# Patient Record
Sex: MALE | Race: WHITE | Employment: FULL TIME | URBAN - NONMETROPOLITAN AREA
[De-identification: names, ages, dates, MRNs, and addresses within clinical notes are randomized per-mention and may not be internally consistent; named-entity substitution may affect disease eponyms.]

---

## 2019-02-15 ENCOUNTER — ANESTHESIA (OUTPATIENT)
Dept: ENDOSCOPY | Age: 44
End: 2019-02-15
Payer: COMMERCIAL

## 2019-02-15 ENCOUNTER — HOSPITAL ENCOUNTER (OUTPATIENT)
Age: 44
Setting detail: OUTPATIENT SURGERY
Discharge: HOME | End: 2019-02-15
Attending: INTERNAL MEDICINE | Admitting: INTERNAL MEDICINE
Payer: COMMERCIAL

## 2019-02-15 ENCOUNTER — ANESTHESIA EVENT (OUTPATIENT)
Dept: ENDOSCOPY | Age: 44
End: 2019-02-15
Payer: COMMERCIAL

## 2019-02-15 VITALS
HEART RATE: 79 BPM | BODY MASS INDEX: 37.93 KG/M2 | HEIGHT: 66 IN | SYSTOLIC BLOOD PRESSURE: 111 MMHG | RESPIRATION RATE: 19 BRPM | TEMPERATURE: 98.6 F | OXYGEN SATURATION: 95 % | DIASTOLIC BLOOD PRESSURE: 57 MMHG | WEIGHT: 236 LBS

## 2019-02-15 VITALS
SYSTOLIC BLOOD PRESSURE: 131 MMHG | DIASTOLIC BLOOD PRESSURE: 80 MMHG | RESPIRATION RATE: 21 BRPM | OXYGEN SATURATION: 98 %

## 2019-02-15 PROCEDURE — 6360000002 HC RX W HCPCS: Performed by: REGISTERED NURSE

## 2019-02-15 PROCEDURE — 3609012400 HC EGD TRANSORAL BIOPSY SINGLE/MULTIPLE: Performed by: INTERNAL MEDICINE

## 2019-02-15 PROCEDURE — 7100000000 HC PACU RECOVERY - FIRST 15 MIN: Performed by: INTERNAL MEDICINE

## 2019-02-15 PROCEDURE — 88305 TISSUE EXAM BY PATHOLOGIST: CPT

## 2019-02-15 PROCEDURE — 2500000003 HC RX 250 WO HCPCS: Performed by: REGISTERED NURSE

## 2019-02-15 PROCEDURE — 3700000000 HC ANESTHESIA ATTENDED CARE: Performed by: INTERNAL MEDICINE

## 2019-02-15 PROCEDURE — 2580000003 HC RX 258: Performed by: INTERNAL MEDICINE

## 2019-02-15 PROCEDURE — 3700000001 HC ADD 15 MINUTES (ANESTHESIA): Performed by: INTERNAL MEDICINE

## 2019-02-15 PROCEDURE — 2709999900 HC NON-CHARGEABLE SUPPLY: Performed by: INTERNAL MEDICINE

## 2019-02-15 RX ORDER — PROPOFOL 10 MG/ML
INJECTION, EMULSION INTRAVENOUS PRN
Status: DISCONTINUED | OUTPATIENT
Start: 2019-02-15 | End: 2019-02-15 | Stop reason: SDUPTHER

## 2019-02-15 RX ORDER — LIDOCAINE HYDROCHLORIDE 20 MG/ML
INJECTION, SOLUTION EPIDURAL; INFILTRATION; INTRACAUDAL; PERINEURAL PRN
Status: DISCONTINUED | OUTPATIENT
Start: 2019-02-15 | End: 2019-02-15 | Stop reason: SDUPTHER

## 2019-02-15 RX ORDER — SODIUM CHLORIDE 450 MG/100ML
INJECTION, SOLUTION INTRAVENOUS CONTINUOUS
Status: DISCONTINUED | OUTPATIENT
Start: 2019-02-15 | End: 2019-02-15 | Stop reason: HOSPADM

## 2019-02-15 RX ADMIN — PROPOFOL 240 MG: 10 INJECTION, EMULSION INTRAVENOUS at 11:30

## 2019-02-15 RX ADMIN — LIDOCAINE HYDROCHLORIDE 100 MG: 20 INJECTION, SOLUTION EPIDURAL; INFILTRATION; INTRACAUDAL; PERINEURAL at 11:30

## 2019-02-15 RX ADMIN — SODIUM CHLORIDE: 4.5 INJECTION, SOLUTION INTRAVENOUS at 10:21

## 2019-02-15 ASSESSMENT — PAIN - FUNCTIONAL ASSESSMENT: PAIN_FUNCTIONAL_ASSESSMENT: 0-10

## 2019-02-15 ASSESSMENT — PAIN SCALES - GENERAL: PAINLEVEL_OUTOF10: 0

## 2021-03-20 ENCOUNTER — HOSPITAL ENCOUNTER (OUTPATIENT)
Age: 46
Discharge: HOME | End: 2021-03-20
Payer: COMMERCIAL

## 2021-03-20 LAB
ALBUMIN SERPL-MCNC: 4.3 G/DL (ref 3.5–5.1)
ALP BLD-CCNC: 52 U/L (ref 38–126)
ALT SERPL-CCNC: 23 U/L (ref 11–66)
ANION GAP SERPL CALCULATED.3IONS-SCNC: 9 MEQ/L (ref 8–16)
AST SERPL-CCNC: 21 U/L (ref 5–40)
BILIRUB SERPL-MCNC: 0.3 MG/DL (ref 0.3–1.2)
BUN BLDV-MCNC: 13 MG/DL (ref 7–22)
CALCIUM SERPL-MCNC: 9.4 MG/DL (ref 8.5–10.5)
CHLORIDE BLD-SCNC: 104 MEQ/L (ref 98–111)
CHOLESTEROL, TOTAL: 228 MG/DL (ref 100–199)
CO2: 24 MEQ/L (ref 23–33)
CREAT SERPL-MCNC: 0.9 MG/DL (ref 0.4–1.2)
GFR SERPL CREATININE-BSD FRML MDRD: > 90 ML/MIN/1.73M2
GLUCOSE BLD-MCNC: 97 MG/DL (ref 70–108)
HDLC SERPL-MCNC: 30 MG/DL
LDL CHOLESTEROL CALCULATED: 124 MG/DL
MAGNESIUM: 2.1 MG/DL (ref 1.6–2.4)
POTASSIUM SERPL-SCNC: 4.2 MEQ/L (ref 3.5–5.2)
SODIUM BLD-SCNC: 137 MEQ/L (ref 135–145)
TOTAL IRON BINDING CAPACITY: 365 UG/DL (ref 171–450)
TOTAL PROTEIN: 7.5 G/DL (ref 6.1–8)
TRIGL SERPL-MCNC: 370 MG/DL (ref 0–199)
TSH SERPL DL<=0.05 MIU/L-ACNC: 0.9 UIU/ML (ref 0.4–4.2)

## 2021-03-20 PROCEDURE — 80053 COMPREHEN METABOLIC PANEL: CPT

## 2021-03-20 PROCEDURE — 36415 COLL VENOUS BLD VENIPUNCTURE: CPT

## 2021-03-20 PROCEDURE — 84443 ASSAY THYROID STIM HORMONE: CPT

## 2021-03-20 PROCEDURE — 80061 LIPID PANEL: CPT

## 2021-03-20 PROCEDURE — 83550 IRON BINDING TEST: CPT

## 2021-03-20 PROCEDURE — 83735 ASSAY OF MAGNESIUM: CPT

## 2021-04-09 ENCOUNTER — HOSPITAL ENCOUNTER (OUTPATIENT)
Dept: CT IMAGING | Age: 46
Discharge: HOME | End: 2021-04-09
Payer: COMMERCIAL

## 2021-04-09 DIAGNOSIS — R10.13 ABDOMINAL PAIN, EPIGASTRIC: ICD-10-CM

## 2021-04-09 PROCEDURE — 6360000004 HC RX CONTRAST MEDICATION: Performed by: SURGERY

## 2021-04-09 PROCEDURE — 74178 CT ABD&PLV WO CNTR FLWD CNTR: CPT

## 2021-04-09 RX ADMIN — IOPAMIDOL 85 ML: 755 INJECTION, SOLUTION INTRAVENOUS at 09:12

## 2021-04-09 RX ADMIN — IOHEXOL 50 ML: 240 INJECTION, SOLUTION INTRATHECAL; INTRAVASCULAR; INTRAVENOUS; ORAL at 09:11

## 2021-09-18 ENCOUNTER — HOSPITAL ENCOUNTER (OUTPATIENT)
Age: 46
Discharge: HOME | End: 2021-09-18
Payer: COMMERCIAL

## 2021-09-18 LAB
ALBUMIN SERPL-MCNC: 4.6 G/DL (ref 3.5–5.1)
ALP BLD-CCNC: 56 U/L (ref 38–126)
ALT SERPL-CCNC: 39 U/L (ref 11–66)
ANION GAP SERPL CALCULATED.3IONS-SCNC: 12 MEQ/L (ref 8–16)
AST SERPL-CCNC: 26 U/L (ref 5–40)
BACTERIA: NORMAL
BASOPHILS # BLD: 0.8 %
BASOPHILS ABSOLUTE: 0 THOU/MM3 (ref 0–0.1)
BILIRUB SERPL-MCNC: 0.5 MG/DL (ref 0.3–1.2)
BILIRUBIN URINE: NEGATIVE
BLOOD, URINE: NEGATIVE
BUN BLDV-MCNC: 19 MG/DL (ref 7–22)
CALCIUM SERPL-MCNC: 9.3 MG/DL (ref 8.5–10.5)
CASTS: NORMAL /LPF
CASTS: NORMAL /LPF
CHARACTER, URINE: CLEAR
CHLORIDE BLD-SCNC: 102 MEQ/L (ref 98–111)
CHOLESTEROL, TOTAL: 258 MG/DL (ref 100–199)
CO2: 24 MEQ/L (ref 23–33)
COLOR: YELLOW
CREAT SERPL-MCNC: 0.9 MG/DL (ref 0.4–1.2)
CREATININE, URINE: 226.7 MG/DL
CRYSTALS: NORMAL
EOSINOPHIL # BLD: 2.3 %
EOSINOPHILS ABSOLUTE: 0.1 THOU/MM3 (ref 0–0.4)
EPITHELIAL CELLS, UA: NORMAL /HPF
ERYTHROCYTE [DISTWIDTH] IN BLOOD BY AUTOMATED COUNT: 12.7 % (ref 11.5–14.5)
ERYTHROCYTE [DISTWIDTH] IN BLOOD BY AUTOMATED COUNT: 43.9 FL (ref 35–45)
GFR SERPL CREATININE-BSD FRML MDRD: > 90 ML/MIN/1.73M2
GLUCOSE BLD-MCNC: 105 MG/DL (ref 70–108)
GLUCOSE, URINE: NEGATIVE MG/DL
HCT VFR BLD CALC: 48.1 % (ref 42–52)
HDLC SERPL-MCNC: 32 MG/DL
HEMOGLOBIN: 16 GM/DL (ref 14–18)
IMMATURE GRANS (ABS): 0.01 THOU/MM3 (ref 0–0.07)
IMMATURE GRANULOCYTES: 0.2 %
KETONES, URINE: NEGATIVE
LDL CHOLESTEROL CALCULATED: ABNORMAL MG/DL
LEUKOCYTE ESTERASE, URINE: NEGATIVE
LYMPHOCYTES # BLD: 39.3 %
LYMPHOCYTES ABSOLUTE: 2 THOU/MM3 (ref 1–4.8)
MCH RBC QN AUTO: 31.3 PG (ref 26–33)
MCHC RBC AUTO-ENTMCNC: 33.3 GM/DL (ref 32.2–35.5)
MCV RBC AUTO: 94.1 FL (ref 80–94)
MICROALBUMIN UR-MCNC: < 1.2 MG/DL
MICROALBUMIN/CREAT UR-RTO: 5 MG/G (ref 0–30)
MISCELLANEOUS LAB TEST RESULT: NORMAL
MONOCYTES # BLD: 9 %
MONOCYTES ABSOLUTE: 0.5 THOU/MM3 (ref 0.4–1.3)
NITRITE, URINE: NEGATIVE
NUCLEATED RED BLOOD CELLS: 0 /100 WBC
PH UA: 6 (ref 5–9)
PLATELET # BLD: 232 THOU/MM3 (ref 130–400)
PMV BLD AUTO: 11 FL (ref 9.4–12.4)
POTASSIUM SERPL-SCNC: 4.5 MEQ/L (ref 3.5–5.2)
PROTEIN UA: NEGATIVE MG/DL
RBC # BLD: 5.11 MILL/MM3 (ref 4.7–6.1)
RBC URINE: NORMAL /HPF
RENAL EPITHELIAL, UA: NORMAL
SEG NEUTROPHILS: 48.4 %
SEGMENTED NEUTROPHILS ABSOLUTE COUNT: 2.5 THOU/MM3 (ref 1.8–7.7)
SODIUM BLD-SCNC: 138 MEQ/L (ref 135–145)
SPECIFIC GRAVITY UA: 1.02 (ref 1–1.03)
TOTAL PROTEIN: 7.7 G/DL (ref 6.1–8)
TRIGL SERPL-MCNC: 456 MG/DL (ref 0–199)
TSH SERPL DL<=0.05 MIU/L-ACNC: 0.9 UIU/ML (ref 0.4–4.2)
UROBILINOGEN, URINE: 0.2 EU/DL (ref 0–1)
WBC # BLD: 5.2 THOU/MM3 (ref 4.8–10.8)
WBC UA: NORMAL /HPF
YEAST: NORMAL

## 2021-09-18 PROCEDURE — 82043 UR ALBUMIN QUANTITATIVE: CPT

## 2021-09-18 PROCEDURE — 81001 URINALYSIS AUTO W/SCOPE: CPT

## 2021-09-18 PROCEDURE — 80053 COMPREHEN METABOLIC PANEL: CPT

## 2021-09-18 PROCEDURE — 85025 COMPLETE CBC W/AUTO DIFF WBC: CPT

## 2021-09-18 PROCEDURE — 36415 COLL VENOUS BLD VENIPUNCTURE: CPT

## 2021-09-18 PROCEDURE — 80061 LIPID PANEL: CPT

## 2021-09-18 PROCEDURE — 84443 ASSAY THYROID STIM HORMONE: CPT

## 2021-11-26 ENCOUNTER — HOSPITAL ENCOUNTER (EMERGENCY)
Age: 46
Discharge: HOME | End: 2021-11-26
Payer: COMMERCIAL

## 2021-11-26 VITALS
HEIGHT: 66 IN | HEART RATE: 91 BPM | WEIGHT: 260 LBS | RESPIRATION RATE: 20 BRPM | SYSTOLIC BLOOD PRESSURE: 143 MMHG | DIASTOLIC BLOOD PRESSURE: 94 MMHG | TEMPERATURE: 97.7 F | OXYGEN SATURATION: 99 % | BODY MASS INDEX: 41.78 KG/M2

## 2021-11-26 DIAGNOSIS — J01.90 ACUTE BACTERIAL SINUSITIS: Primary | ICD-10-CM

## 2021-11-26 DIAGNOSIS — B96.89 ACUTE BACTERIAL SINUSITIS: Primary | ICD-10-CM

## 2021-11-26 PROCEDURE — 99213 OFFICE O/P EST LOW 20 MIN: CPT | Performed by: NURSE PRACTITIONER

## 2021-11-26 PROCEDURE — 99213 OFFICE O/P EST LOW 20 MIN: CPT

## 2021-11-26 RX ORDER — AMOXICILLIN AND CLAVULANATE POTASSIUM 875; 125 MG/1; MG/1
1 TABLET, FILM COATED ORAL 2 TIMES DAILY
Qty: 14 TABLET | Refills: 0 | Status: SHIPPED | OUTPATIENT
Start: 2021-11-26 | End: 2021-12-03

## 2021-11-26 RX ORDER — PREDNISONE 20 MG/1
40 TABLET ORAL DAILY
Qty: 10 TABLET | Refills: 0 | Status: SHIPPED | OUTPATIENT
Start: 2021-11-26 | End: 2021-12-01

## 2021-11-26 ASSESSMENT — ENCOUNTER SYMPTOMS
COUGH: 0
SORE THROAT: 0
NAUSEA: 0
SINUS PRESSURE: 1
VOMITING: 0
DIARRHEA: 0
SHORTNESS OF BREATH: 0

## 2021-11-26 NOTE — ED NOTES
Patient discharge instructions given to pt and pt verbalized understanding, px given, no other needs at this time, and pt left in stable condition.      Nicho Kearns RN  11/26/21 0992

## 2021-12-18 ENCOUNTER — HOSPITAL ENCOUNTER (EMERGENCY)
Age: 46
Discharge: HOME | End: 2021-12-18
Payer: COMMERCIAL

## 2021-12-18 VITALS
SYSTOLIC BLOOD PRESSURE: 136 MMHG | HEART RATE: 85 BPM | RESPIRATION RATE: 18 BRPM | TEMPERATURE: 97.5 F | DIASTOLIC BLOOD PRESSURE: 90 MMHG | OXYGEN SATURATION: 96 %

## 2021-12-18 DIAGNOSIS — J01.90 ACUTE BACTERIAL SINUSITIS: Primary | ICD-10-CM

## 2021-12-18 DIAGNOSIS — B96.89 ACUTE BACTERIAL SINUSITIS: Primary | ICD-10-CM

## 2021-12-18 PROCEDURE — 99213 OFFICE O/P EST LOW 20 MIN: CPT | Performed by: NURSE PRACTITIONER

## 2021-12-18 PROCEDURE — 99213 OFFICE O/P EST LOW 20 MIN: CPT

## 2021-12-18 RX ORDER — PREDNISONE 20 MG/1
40 TABLET ORAL DAILY
Qty: 10 TABLET | Refills: 0 | Status: SHIPPED | OUTPATIENT
Start: 2021-12-18 | End: 2021-12-23

## 2021-12-18 RX ORDER — DOXYCYCLINE HYCLATE 100 MG
100 TABLET ORAL 2 TIMES DAILY
Qty: 14 TABLET | Refills: 0 | Status: SHIPPED | OUTPATIENT
Start: 2021-12-18 | End: 2021-12-25

## 2021-12-18 ASSESSMENT — ENCOUNTER SYMPTOMS
NAUSEA: 0
SINUS PRESSURE: 1
SHORTNESS OF BREATH: 0
COUGH: 1
VOMITING: 0
SORE THROAT: 1

## 2021-12-18 ASSESSMENT — PAIN DESCRIPTION - LOCATION: LOCATION: THROAT

## 2021-12-18 ASSESSMENT — PAIN DESCRIPTION - DESCRIPTORS: DESCRIPTORS: SORE

## 2021-12-18 ASSESSMENT — PAIN SCALES - GENERAL: PAINLEVEL_OUTOF10: 2

## 2021-12-18 NOTE — ED NOTES
Patient discharge instructions given to pt and pt verbalized understanding, 2 px given, no other needs at this time, and pt left in stable condition.      Ngoc Dodson RN  12/18/21 1759

## 2021-12-18 NOTE — ED TRIAGE NOTES
Patient walked to room 6 for nasal congestion, sore throat, post nasal drip and a cough worse the past 3 days. Patient was seen here on [facility] Friday and given antibiotics and steroid and not better after the first round. No other needs.

## 2021-12-18 NOTE — ED PROVIDER NOTES
[address]  Urgent Care Encounter       CHIEF COMPLAINT       Chief Complaint   Patient presents with    Cough     yellow phlegm onset 3 days ago     Nasal Congestion     post nasal drip onset a month ago        Nurses Notes reviewed and I agree except as noted in the HPI. HISTORY OF PRESENT ILLNESS   Norma Bucio is a 42 y.o. male who presents with complaints of sinus congestion, PND and cough. Symptoms have been ongoing for the last 3 weeks. He was seen on black Friday and treated for a sinus infection. Symptoms did improve but did not completely go away. 4 days ago symptoms began to get worse. He also reports some ear fullness and a irritated throat. No fever, chills or body aches. He has taken multiple home Covid test which have all been negative with the last 1 being on Tuesday. The history is provided by the patient. REVIEW OF SYSTEMS     Review of Systems   Constitutional: Negative for chills and fever. HENT: Positive for ear pain (fullness), postnasal drip, sinus pressure and sore throat (mild). Negative for congestion. Respiratory: Positive for cough. Negative for shortness of breath. Cardiovascular: Negative for chest pain. Gastrointestinal: Negative for nausea and vomiting. Musculoskeletal: Negative for myalgias. Skin: Negative for rash. Neurological: Negative for headaches. PAST MEDICAL HISTORY         Diagnosis Date    GERD (gastroesophageal reflux disease)     Hiatal hernia        SURGICALHISTORY     Patient  has a past surgical history that includes Tonsillectomy (2011); Adenoidectomy; Breast lumpectomy (2004); Upper gastrointestinal endoscopy (x2); hernia repair; Gastric fundoplication (92/0515); and Upper gastrointestinal endoscopy (N/A, 2/15/2019).     CURRENT MEDICATIONS       Discharge Medication List as of 12/18/2021 10:35 AM      CONTINUE these medications which have NOT CHANGED    Details   esomeprazole (NEXIUM) 40 MG capsule Take 40 mg by mouth every morning (before breakfast). fenofibrate (TRIGLIDE) 160 MG tablet Take 160 mg by mouth daily. ALLERGIES     Patient is has No Known Allergies. Patients   There is no immunization history on file for this patient. FAMILY HISTORY     Patient's family history is not on file. He was adopted. SOCIAL HISTORY     Patient  reports that he has never smoked. He has never used smokeless tobacco. He reports that he does not drink alcohol and does not use drugs. PHYSICAL EXAM     ED TRIAGE VITALS  BP: (!) 136/90, Temp: 97.5 °F (36.4 °C), Pulse: 85, Resp: 18, SpO2: 96 %,Estimated body mass index is 41.97 kg/m² as calculated from the following:    Height as of 11/26/21: 5' 6\" (1.676 m). Weight as of 11/26/21: 260 lb (117.9 kg). ,No LMP for male patient. Physical Exam  Vitals and nursing note reviewed. Constitutional:       General: He is not in acute distress. Appearance: He is well-developed. He is not ill-appearing. HENT:      Head: Normocephalic and atraumatic. Right Ear: Tympanic membrane and ear canal normal.      Left Ear: Tympanic membrane and ear canal normal.      Nose: Congestion present. Right Sinus: Maxillary sinus tenderness present. No frontal sinus tenderness. Left Sinus: Maxillary sinus tenderness present. No frontal sinus tenderness. Mouth/Throat:      Lips: Pink. Mouth: Mucous membranes are moist.      Pharynx: Oropharynx is clear. Uvula midline. No posterior oropharyngeal erythema. Eyes:      General: Lids are normal. No scleral icterus. Conjunctiva/sclera: Conjunctivae normal.      Pupils: Pupils are equal.   Cardiovascular:      Rate and Rhythm: Normal rate and regular rhythm. Heart sounds: Normal heart sounds, S1 normal and S2 normal.   Pulmonary:      Effort: Pulmonary effort is normal. No respiratory distress. Breath sounds: Normal breath sounds.    Musculoskeletal:      Comments: Normal active ROM x 4 extremities  Gait steady   Lymphadenopathy:      Comments: No head or neck adenopathy   Skin:     General: Skin is warm and dry. Findings: No rash (to exposed skin). Neurological:      General: No focal deficit present. Mental Status: He is alert and oriented to person, place, and time. Sensory: No sensory deficit. Comments: Answers questions readily and appropriately   Psychiatric:         Mood and Affect: Mood normal.         Speech: Speech normal.         Behavior: Behavior normal. Behavior is cooperative. DIAGNOSTIC RESULTS     Labs:No results found for this visit on 12/18/21. IMAGING:    No orders to display         EKG:      URGENT CARE COURSE:     Vitals:    12/18/21 1004   BP: (!) 136/90   Pulse: 85   Resp: 18   Temp: 97.5 °F (36.4 °C)   SpO2: 96%       Medications - No data to display         PROCEDURES:  None    FINAL IMPRESSION      1. Acute bacterial sinusitis          DISPOSITION/ PLAN     Patient presents with acute bacterial sinusitis. Treat with doxycycline and prednisone. Patient also instructed to use Flonase nasal spray daily. Can use over-the-counter medications as well. Follow-up with family doctor in the next week if not improved. Further instructions were outlined verbally and in the patient's discharge instructions. All the patient's questions were answered. The patient/parent agreed with the plan and was discharged from the [facility] in good condition.       PATIENT REFERRED TO:  Maria A Raymundo MD  [facility] / LEXA JACKSON .[facility] [id number]      DISCHARGE MEDICATIONS:  Discharge Medication List as of 12/18/2021 10:35 AM      START taking these medications    Details   doxycycline hyclate (VIBRA-TABS) 100 MG tablet Take 1 tablet by mouth 2 times daily for 7 days, Disp-14 tablet, R-0Normal      predniSONE (DELTASONE) 20 MG tablet Take 2 tablets by mouth daily for 5 days, Disp-10 tablet, R-0Normal             Discharge Medication List as of 12/18/2021 10:35 AM          Discharge Medication List as of 12/18/2021 10:35 AM          Yariel Caraballo, KAYLA - CNP    (Please note that portions of this note were completed with a voice recognition program. Efforts were made to edit the dictations but occasionally words are mis-transcribed.)         Yariel Caraballo, KAYLA - RAHEEM  12/18/21 2046

## 2022-01-22 ENCOUNTER — HOSPITAL ENCOUNTER (OUTPATIENT)
Age: 47
Discharge: HOME | End: 2022-01-22
Payer: COMMERCIAL

## 2022-01-22 LAB
ALBUMIN SERPL-MCNC: 4.7 G/DL (ref 3.5–5.1)
ALP BLD-CCNC: 60 U/L (ref 38–126)
ALT SERPL-CCNC: 38 U/L (ref 11–66)
AST SERPL-CCNC: 27 U/L (ref 5–40)
AVERAGE GLUCOSE: 111 MG/DL (ref 70–126)
BILIRUB SERPL-MCNC: 0.6 MG/DL (ref 0.3–1.2)
BILIRUBIN DIRECT: < 0.2 MG/DL (ref 0–0.3)
CHOLESTEROL, TOTAL: 186 MG/DL (ref 100–199)
HBA1C MFR BLD: 5.7 % (ref 4.4–6.4)
HDLC SERPL-MCNC: 34 MG/DL
LDL CHOLESTEROL CALCULATED: 89 MG/DL
TOTAL PROTEIN: 7.3 G/DL (ref 6.1–8)
TRIGL SERPL-MCNC: 313 MG/DL (ref 0–199)

## 2022-01-22 PROCEDURE — 83036 HEMOGLOBIN GLYCOSYLATED A1C: CPT

## 2022-01-22 PROCEDURE — 36415 COLL VENOUS BLD VENIPUNCTURE: CPT

## 2022-01-22 PROCEDURE — 80076 HEPATIC FUNCTION PANEL: CPT

## 2022-01-22 PROCEDURE — 80061 LIPID PANEL: CPT

## 2023-02-21 ENCOUNTER — HOSPITAL ENCOUNTER (EMERGENCY)
Age: 48
Discharge: HOME | End: 2023-02-21
Payer: COMMERCIAL

## 2023-02-21 VITALS
TEMPERATURE: 98 F | OXYGEN SATURATION: 98 % | DIASTOLIC BLOOD PRESSURE: 83 MMHG | SYSTOLIC BLOOD PRESSURE: 131 MMHG | RESPIRATION RATE: 14 BRPM | WEIGHT: 230 LBS | BODY MASS INDEX: 37.12 KG/M2 | HEART RATE: 101 BPM

## 2023-02-21 DIAGNOSIS — J02.0 STREP PHARYNGITIS: Primary | ICD-10-CM

## 2023-02-21 LAB
FLUAV AG SPEC QL: NEGATIVE
FLUBV AG SPEC QL: NEGATIVE
S PYO AG THROAT QL: POSITIVE

## 2023-02-21 PROCEDURE — 99213 OFFICE O/P EST LOW 20 MIN: CPT

## 2023-02-21 PROCEDURE — 87651 STREP A DNA AMP PROBE: CPT

## 2023-02-21 PROCEDURE — 99213 OFFICE O/P EST LOW 20 MIN: CPT | Performed by: NURSE PRACTITIONER

## 2023-02-21 PROCEDURE — 87804 INFLUENZA ASSAY W/OPTIC: CPT

## 2023-02-21 RX ORDER — AMOXICILLIN 500 MG/1
500 CAPSULE ORAL 2 TIMES DAILY
Qty: 20 CAPSULE | Refills: 0 | Status: SHIPPED | OUTPATIENT
Start: 2023-02-21 | End: 2023-03-03

## 2023-02-21 ASSESSMENT — ENCOUNTER SYMPTOMS
SHORTNESS OF BREATH: 0
CONSTIPATION: 0
WHEEZING: 0
VOMITING: 0
SORE THROAT: 1
COUGH: 0
SINUS PRESSURE: 0
RHINORRHEA: 0
ABDOMINAL PAIN: 0
EYE PAIN: 0
NAUSEA: 0
BLOOD IN STOOL: 0
DIARRHEA: 0

## 2023-02-21 ASSESSMENT — PAIN SCALES - GENERAL: PAINLEVEL_OUTOF10: 2

## 2023-02-21 ASSESSMENT — PAIN DESCRIPTION - LOCATION: LOCATION: GENERALIZED

## 2023-02-21 ASSESSMENT — PAIN - FUNCTIONAL ASSESSMENT: PAIN_FUNCTIONAL_ASSESSMENT: 0-10

## 2023-02-21 NOTE — ED PROVIDER NOTES
[facility] Encounter      [address]       Chief Complaint   Patient presents with    Pharyngitis        Nurses Notes reviewed and I agree except as noted in the HPI. HISTORY OF PRESENT ILLNESS   Ricardo Spence is a 41 y.o. male who presents to urgent care with complaint of fever, generalized body aches and sore throat that started 2 days ago. Patient states he has been taking Tylenol for his symptoms. He denies other symptoms including chest pain, shortness of breath, difficulty swallowing, nausea, vomiting or diarrhea. REVIEW OF SYSTEMS     Review of Systems   Constitutional:  Positive for fever. Negative for appetite change, chills, fatigue and unexpected weight change. HENT:  Positive for sore throat. Negative for ear pain, rhinorrhea and sinus pressure. Eyes:  Negative for pain and visual disturbance. Respiratory:  Negative for cough, shortness of breath and wheezing. Cardiovascular:  Negative for chest pain, palpitations and leg swelling. Gastrointestinal:  Negative for abdominal pain, blood in stool, constipation, diarrhea, nausea and vomiting. Genitourinary:  Negative for dysuria, frequency and hematuria. Musculoskeletal:  Positive for myalgias. Negative for arthralgias and joint swelling. Skin:  Negative for rash. Neurological:  Negative for dizziness, syncope, weakness, light-headedness and headaches. Hematological:  Does not bruise/bleed easily. PAST MEDICAL HISTORY         Diagnosis Date    GERD (gastroesophageal reflux disease)     Hiatal hernia        SURGICAL HISTORY     Patient  has a past surgical history that includes Tonsillectomy (2011); Adenoidectomy; Breast lumpectomy (2004); Upper gastrointestinal endoscopy (x2); hernia repair; Gastric fundoplication (98/1573); and Upper gastrointestinal endoscopy (N/A, 2/15/2019).     CURRENT MEDICATIONS       Previous Medications    No medications on file       ALLERGIES     Patient is has No Known Allergies. FAMILY HISTORY     Patient'sfamily history is not on file. He was adopted. SOCIAL HISTORY     Patient  reports that he has never smoked. He has never used smokeless tobacco. He reports current alcohol use. He reports that he does not use drugs. PHYSICAL EXAM     ED TRIAGE VITALS  BP: 131/83, Temp: 98 °F (36.7 °C), Heart Rate: (!) 101, Resp: 14, SpO2: 98 %  Physical Exam  Vitals and nursing note reviewed. Constitutional:       General: He is not in acute distress. Appearance: He is well-developed. He is not ill-appearing, toxic-appearing or diaphoretic. HENT:      Head: Normocephalic and atraumatic. Nose: No congestion or rhinorrhea. Mouth/Throat:      Mouth: Mucous membranes are moist.      Pharynx: Posterior oropharyngeal erythema present. Tonsils: Tonsillar exudate present. 1+ on the right. 1+ on the left. Eyes:      Conjunctiva/sclera: Conjunctivae normal.   Cardiovascular:      Rate and Rhythm: Normal rate and regular rhythm. Heart sounds: Normal heart sounds. No murmur heard. No gallop. Pulmonary:      Effort: Pulmonary effort is normal. No respiratory distress. Breath sounds: Normal breath sounds. No decreased breath sounds, wheezing, rhonchi or rales. Abdominal:      Palpations: Abdomen is soft. Musculoskeletal:         General: Normal range of motion. Cervical back: Normal range of motion and neck supple. Lymphadenopathy:      Cervical: Cervical adenopathy present. Right cervical: Superficial cervical adenopathy present. Left cervical: Superficial cervical adenopathy present. Skin:     General: Skin is warm and dry. Neurological:      Mental Status: He is alert and oriented to person, place, and time.        DIAGNOSTIC RESULTS   Labs:  Results for orders placed or performed during the hospital encounter of 02/21/23   Strep Screen Group A Throat   Result Value Ref Range    Rapid Strep A Screen POSITIVE (A) IMAGING:  No orders to display     URGENT CARE COURSE:        MDM      Patient presents to urgent care with complaint of fever, generalized body aches and sore throat that started 2 days ago. Differential diagnosis include not limited to  strep throat or viral illness. Is positive. Patient will be treated with oral amoxicillin. Patient to follow-up with primary care provider. Patient instructed to go to ER for worsening symptoms, inability to swallow, inability to keep liquids down, inability to urinate for greater than 8 hours or difficulty breathing. Follow-up with your primary care provider. Increase oral intake. Warm salt water gargles after meals and at bedtime to help with sore throat. May take tylenol or ibuprofen as needed for pain or fever. Medications - No data to display  PROCEDURES:    Procedures    FINALIMPRESSION      1.  Strep pharyngitis        DISPOSITION/PLAN   DISPOSITION Decision To Discharge 02/21/2023 08:42:56 AM    PATIENT REFERRED TO:  MD Omer Apipah [address] 23 [id number]    In 2 days    DISCHARGE MEDICATIONS:  New Prescriptions    AMOXICILLIN (AMOXIL) 500 MG CAPSULE    Take 1 capsule by mouth 2 times daily for 10 days     Current Discharge Medication List          KAYLA Amador - KAYLA Baker - CNP  02/21/23 2323 ANSLEY Dalton, KAYLA - RAHEEM  02/21/23 7974

## 2023-02-21 NOTE — ED TRIAGE NOTES
Thang Alva arrives to room with complaint of  fever 100 on Sunday, 102 yesterday, headache, body aches, sore throat , sinus congestion symptoms started 3 days ago.        Work note

## 2023-02-21 NOTE — Clinical Note
Suyapa Serrato was seen and treated in our emergency department on 2/21/2023. He may return to work on 02/23/2023. If you have any questions or concerns, please don't hesitate to call.       Kyle Lucia, APRN - CNP

## 2024-02-29 ENCOUNTER — HOSPITAL ENCOUNTER (OUTPATIENT)
Dept: OCCUPATIONAL THERAPY | Age: 49
Setting detail: THERAPIES SERIES
Discharge: HOME | End: 2024-02-29
Payer: COMMERCIAL

## 2024-02-29 PROCEDURE — 97165 OT EVAL LOW COMPLEX 30 MIN: CPT

## 2024-02-29 PROCEDURE — 97110 THERAPEUTIC EXERCISES: CPT

## 2024-02-29 NOTE — PROGRESS NOTES
** PLEASE SIGN, DATE AND TIME CERTIFICATION BELOW AND RETURN TO [facility] OUTPATIENT REHABILITATION (FAX #: [phone]).  ATTEST/CO-SIGN IF ACCESSING VIA [employer].  THANK YOU.**    I certify that I have examined the patient below and determined that Physical Medicine and Rehabilitation service is necessary and that I approve the established plan of care for up to 90 days or as specifically noted.  Attestation, signature or co-signature of physician indicates approval of certification requirements.    ________________________ ____________ __________  Physician Signature   Date   Time   [facility]  OCCUPATIONAL THERAPY  [x] EVALUATION  [] DAILY NOTE (LAND) [] DAILY NOTE (AQUATIC ) [] PROGRESS NOTE [] DISCHARGE NOTE    [] OUTPATIENT REHABILITATION [facility]   [x] [facility]    [] [facility]   [] [facility]    Date: 2024  Patient Name:  Yordan Nunn  : 1975  MRN: [id number]  CSN: [id number]    Referring Practitioner Norris Milton MD    Diagnosis Superior glenoid labrum lesion of left shoulder, initial encounter    Treatment Diagnosis M25.512  Left Shoulder Pain  R53.1 Weakness   Date of Evaluation 24    Additional Pertinent History  has a past medical history of GERD (gastroesophageal reflux disease) and Hiatal hernia.        Functional Outcome Measure Used SPADI   Functional Outcome Score 125 (24) Total SPADI score      Insurance: Primary: Payor: MEDICAL [employer] /  /  / ,   Secondary:    Authorization Information: PRE CERTIFICATION REQUIRED: No precert required.  INSURANCE THERAPY BENEFIT: NO LIMIT BASED ON MED NECESSITY. .   AQUATIC THERAPY COVERED: Yes  MODALITIES COVERED:  Yes   Approved Procedure Codes: Authorization of Specific CPT Codes Not Required  (Codes requested indicated by red font, codes approved indicated by black font)   Visit # 1, 1/10 for progress note (Reporting Period: 24 to     )          Visits Allowed:

## 2024-03-04 ENCOUNTER — HOSPITAL ENCOUNTER (OUTPATIENT)
Dept: OCCUPATIONAL THERAPY | Age: 49
Setting detail: THERAPIES SERIES
Discharge: HOME | End: 2024-03-04
Payer: COMMERCIAL

## 2024-03-04 PROCEDURE — 97110 THERAPEUTIC EXERCISES: CPT

## 2024-03-04 NOTE — PROGRESS NOTES
[facility]  OCCUPATIONAL THERAPY  [] EVALUATION  [x] DAILY NOTE (LAND) [] DAILY NOTE (AQUATIC ) [] PROGRESS NOTE [] DISCHARGE NOTE    [] OUT[facility]IENT [facility]ILITATION C[facility] [place]   [x] [place] [facility]    [] [facility]   [] [facility]    Date: 3/4/2024  Patient Name:  Yordan Nunn  : 1975  MRN: [id number]  CSN: [id number]    Referring Practitioner Norris Milton MD    Diagnosis Superior glenoid labrum lesion of left shoulder, initial encounter    Treatment Diagnosis M25.512  Left Shoulder Pain  R53.1 Weakness   Date of Evaluation 24    Additional Pertinent History  has a past medical history of GERD (gastroesophageal reflux disease) and Hiatal hernia.        Functional Outcome Measure Used SPADI   Functional Outcome Score 125 (24) Total SPADI score      Insurance: Primary: Payor: [employer] /  /  / ,   Secondary:    Authorization Information: PRE CERTIFICATION REQUIRED: No precert required.  INSURANCE THERAPY BENEFIT: NO LIMIT BASED ON MED NECESSITY. .   AQUATIC THERAPY COVERED: Yes  MODALITIES COVERED:  Yes   Approved Procedure Codes: Authorization of Specific CPT Codes Not Required  (Codes requested indicated by red font, codes approved indicated by black font)   Visit # 2, 2/10 for progress note (Reporting Period: 24 to     )          Visits Allowed:    Recertification Date: 24   Physician Follow-Up: ?   Physician Orders: Follow protocol: SAD, DCR, BT, no resistive elbow flexion x 6 weeks   History of Present Illness: Patient started in the left shoulder approximately 2 years ago.  Patient underwent left shoulder arthroscopic DCR, BT and debridement on 24.   Patient has been taking pain medication in addition to the pain pump.  Wife pulling the pain pump later today.       SUBJECTIVE: Patient reports improvements in all areas of his HEP.      OBJECTIVE:  TREATMENT   Precautions: Follow protocol for SDA, DCR, BT.  No

## 2024-03-07 ENCOUNTER — HOSPITAL ENCOUNTER (OUTPATIENT)
Dept: OCCUPATIONAL THERAPY | Age: 49
Setting detail: THERAPIES SERIES
Discharge: HOME | End: 2024-03-07
Payer: COMMERCIAL

## 2024-03-07 PROCEDURE — 97110 THERAPEUTIC EXERCISES: CPT

## 2024-03-07 NOTE — PROGRESS NOTES
[facility]  OCCUPATIONAL THERAPY  [] EVALUATION  [x] DAILY NOTE (LAND) [] DAILY NOTE (AQUATIC ) [] PROGRESS NOTE [] DISCHARGE NOTE    [] OUTPATIENT RE[facility]ABILITATION CENTER [facility] [place]   [x] [place] [facility]    [] [facility]   [] [facility]    Date: 3/7/2024  Patient Name:  Yordan Nunn  : 1975  MRN: [id number]  CSN: [id number]    Referring Practitioner Norris Milton MD    Diagnosis Superior glenoid labrum lesion of left shoulder, initial encounter    Treatment Diagnosis M25.512  Left Shoulder Pain  R53.1 Weakness   Date of Evaluation 24    Additional Pertinent History  has a past medical history of GERD (gastroesophageal reflux disease) and Hiatal hernia.        Functional Outcome Measure Used SPADI   Functional Outcome Score 125 (24) Total SPADI score      Insurance: Primary: Payor: [employer] /  /  / ,   Secondary:    Authorization Information: PRE CERTIFICATION REQUIRED: No precert required.  INSURANCE THERAPY BENEFIT: NO LIMIT BASED ON MED NECESSITY. .   AQUATIC THERAPY COVERED: Yes  MODALITIES COVERED:  Yes   Approved Procedure Codes: Authorization of Specific CPT Codes Not Required  (Codes requested indicated by red font, codes approved indicated by black font)   Visit # 3, 3/10 for progress note (Reporting Period: 24 to     )          Visits Allowed:    Recertification Date: 24   Physician Follow-Up: ?   Physician Orders: Follow protocol: SAD, DCR, BT, no resistive elbow flexion x 6 weeks   History of Present Illness: Patient started in the left shoulder approximately 2 years ago.  Patient underwent left shoulder arthroscopic DCR, BT and debridement on 24.   Patient has been taking pain medication in addition to the pain pump.  Wife pulling the pain pump later today.       SUBJECTIVE: Patient and wife report good performance of HEP.        OBJECTIVE:  TREATMENT   Precautions: Follow protocol for SAD, DCR, BT.  No

## 2024-03-12 ENCOUNTER — HOSPITAL ENCOUNTER (OUTPATIENT)
Dept: OCCUPATIONAL THERAPY | Age: 49
Setting detail: THERAPIES SERIES
Discharge: HOME | End: 2024-03-12
Payer: COMMERCIAL

## 2024-03-12 PROCEDURE — 97140 MANUAL THERAPY 1/> REGIONS: CPT

## 2024-03-12 PROCEDURE — 97110 THERAPEUTIC EXERCISES: CPT

## 2024-03-12 NOTE — PROGRESS NOTES
Access Code for HEP: distal ROM, scapular pinches, table slides, pendulums  3/12/24: pendulums all directions. Reviewed protocol parameters   []  No new Education completed  []  Reviewed Prior HEP      [x]  Patient verbalized and/or demonstrated understanding of education provided.  []  Patient unable to verbalize and/or demonstrate understanding of education provided.  Will continue education.  [x]  Barriers to learning: none    PLAN:  Treatment Recommendations: Strengthening, Range of Motion, Manual Therapy - Soft Tissue Mobilization, Manual Therapy - Joint Manipulation, Home Exercise Program, Patient Education, Self-Care Education and Training, Integrative Dry Needling, and Modalities    []  Plan of care initiated.  Plan to see patient 2 times per week for 12 weeks to address the treatment planned outlined above.  [x]  Continue with current plan of care  []  Modify plan of care as follows:    []  Hold pending physician visit  []  Discharge    Time In 1432   Time Out 1505   Timed Code Minutes: 33 min   Total Treatment Time: 33 min       Electronically Signed by: Yamileth CONTRREAS #[id number]

## 2024-03-14 ENCOUNTER — APPOINTMENT (OUTPATIENT)
Dept: OCCUPATIONAL THERAPY | Age: 49
End: 2024-03-14
Payer: COMMERCIAL

## 2024-03-15 ENCOUNTER — HOSPITAL ENCOUNTER (OUTPATIENT)
Dept: OCCUPATIONAL THERAPY | Age: 49
Setting detail: THERAPIES SERIES
Discharge: HOME | End: 2024-03-15
Payer: COMMERCIAL

## 2024-03-15 PROCEDURE — 97110 THERAPEUTIC EXERCISES: CPT

## 2024-03-15 NOTE — PROGRESS NOTES
distal ROM, scapular pinches, table slides, pendulums  3/12/24: pendulums all directions. Reviewed protocol parameters   []  No new Education completed  []  Reviewed Prior HEP      [x]  Patient verbalized and/or demonstrated understanding of education provided.  []  Patient unable to verbalize and/or demonstrate understanding of education provided.  Will continue education.  [x]  Barriers to learning: none    PLAN:  Treatment Recommendations: Strengthening, Range of Motion, Manual Therapy - Soft Tissue Mobilization, Manual Therapy - Joint Manipulation, Home Exercise Program, Patient Education, Self-Care Education and Training, Integrative Dry Needling, and Modalities    []  Plan of care initiated.  Plan to see patient 2 times per week for 12 weeks to address the treatment planned outlined above.  [x]  Continue with current plan of care  []  Modify plan of care as follows:    []  Hold pending physician visit  []  Discharge    Time In 1000   Time Out 1028   Timed Code Minutes: 28 min   Total Treatment Time: 28 min       Electronically Signed by: FRANTZ Funes, OTR/L [id number]

## 2024-03-18 ENCOUNTER — APPOINTMENT (OUTPATIENT)
Dept: OCCUPATIONAL THERAPY | Age: 49
End: 2024-03-18
Payer: COMMERCIAL

## 2024-03-21 ENCOUNTER — HOSPITAL ENCOUNTER (OUTPATIENT)
Dept: OCCUPATIONAL THERAPY | Age: 49
Setting detail: THERAPIES SERIES
Discharge: HOME | End: 2024-03-21
Payer: COMMERCIAL

## 2024-03-21 PROCEDURE — 97110 THERAPEUTIC EXERCISES: CPT

## 2024-03-21 NOTE — PROGRESS NOTES
[facility]  OCCUPATIONAL THERAPY  [] EVALUATION  [x] DAILY NOTE (LAND) [] DAILY NOTE (AQUATIC ) [] PROGRESS NOTE [] DISCHARGE NOTE    [] OUTPATIENT [facility]ABILITATION CENTER [facility] [place]   [x] [place] [facility]    [] [facility]   [] [facility]    Date: 3/21/2024  Patient Name:  Yordan Nunn  : 1975  MRN: [id number]  CSN: [id number]    Referring Practitioner Norris Milton MD    Diagnosis Superior glenoid labrum lesion of left shoulder, initial encounter    Treatment Diagnosis M25.512  Left Shoulder Pain  R53.1 Weakness   Date of Evaluation 24    Additional Pertinent History  has a past medical history of GERD (gastroesophageal reflux disease) and Hiatal hernia.        Functional Outcome Measure Used SPADI   Functional Outcome Score 125 (24) Total SPADI score      Insurance: Primary: Payor: [employer] /  /  / ,   Secondary:    Authorization Information: PRE CERTIFICATION REQUIRED: No precert required.  INSURANCE THERAPY BENEFIT: NO LIMIT BASED ON MED NECESSITY. .   AQUATIC THERAPY COVERED: Yes  MODALITIES COVERED:  Yes   Approved Procedure Codes: Authorization of Specific CPT Codes Not Required  (Codes requested indicated by red font, codes approved indicated by black font)   Visit # 6, 6/10 for progress note (Reporting Period: 24 to     )          Visits Allowed:    Recertification Date: 24   Physician Follow-Up: ?   Physician Orders: Follow protocol: SAD, DCR, BT, no resistive elbow flexion x 6 weeks   History of Present Illness: Patient started in the left shoulder approximately 2 years ago.  Patient underwent left shoulder arthroscopic DCR, BT and debridement on 24.   Patient has been taking pain medication in addition to the pain pump.  Wife pulling the pain pump later today.       SUBJECTIVE: Patient reports that he really likes the pulleys.  Reports that he returns to work on 4/10/24.      OBJECTIVE:  TREATMENT

## 2024-03-22 ENCOUNTER — APPOINTMENT (OUTPATIENT)
Dept: OCCUPATIONAL THERAPY | Age: 49
End: 2024-03-22
Payer: COMMERCIAL

## 2024-03-26 ENCOUNTER — HOSPITAL ENCOUNTER (OUTPATIENT)
Dept: OCCUPATIONAL THERAPY | Age: 49
Setting detail: THERAPIES SERIES
Discharge: HOME | End: 2024-03-26
Payer: COMMERCIAL

## 2024-03-26 PROCEDURE — 97110 THERAPEUTIC EXERCISES: CPT

## 2024-03-26 NOTE — PROGRESS NOTES
[facility]  OCCUPATIONAL THERAPY  [] EVALUATION  [x] DAILY NOTE (LAND) [] DAILY NOTE (AQUATIC ) [] PROGRESS NOTE [] DISCHARGE NOTE    [] [facility] [place]   [x] [place] [facility]    [] [facility]   [] [facility]    Date: 3/26/2024  Patient Name:  Yordan Nunn  : 1975  MRN: [id number]  CSN: [id number]    Referring Practitioner Norris Milton MD    Diagnosis Superior glenoid labrum lesion of left shoulder, initial encounter    Treatment Diagnosis M25.512  Left Shoulder Pain  R53.1 Weakness   Date of Evaluation 24    Additional Pertinent History  has a past medical history of GERD (gastroesophageal reflux disease) and Hiatal hernia.        Functional Outcome Measure Used SPADI   Functional Outcome Score 125 (24) Total SPADI score      Insurance: Primary: Payor: [employer] /  /  / ,   Secondary:    Authorization Information: PRE CERTIFICATION REQUIRED: No precert required.  INSURANCE THERAPY BENEFIT: NO LIMIT BASED ON MED NECESSITY. .   AQUATIC THERAPY COVERED: Yes  MODALITIES COVERED:  Yes   Approved Procedure Codes: Authorization of Specific CPT Codes Not Required  (Codes requested indicated by red font, codes approved indicated by black font)   Visit # 7, 10 for progress note (Reporting Period: 24 to     )          Visits Allowed:    Recertification Date: 24   Physician Follow-Up: ?   Physician Orders: Follow protocol: SAD, DCR, BT, no resistive elbow flexion x 6 weeks   History of Present Illness: Patient started in the left shoulder approximately 2 years ago.  Patient underwent left shoulder arthroscopic DCR, BT and debridement on 24.   Patient has been taking pain medication in addition to the pain pump.  Wife pulling the pain pump later today.       SUBJECTIVE: Patient eager to return to work.  Complaint of catching during AROM flexion during full arc and during modifies partial arc.

## 2024-03-28 ENCOUNTER — HOSPITAL ENCOUNTER (OUTPATIENT)
Dept: OCCUPATIONAL THERAPY | Age: 49
Setting detail: THERAPIES SERIES
Discharge: HOME | End: 2024-03-28
Payer: COMMERCIAL

## 2024-03-28 PROCEDURE — 97140 MANUAL THERAPY 1/> REGIONS: CPT

## 2024-03-28 PROCEDURE — 97110 THERAPEUTIC EXERCISES: CPT

## 2024-03-28 NOTE — PROGRESS NOTES
[facility]  OCCUPATIONAL THERAPY  [] EVALUATION  [x] DAILY NOTE (LAND) [] DAILY NOTE (AQUATIC ) [] PROGRESS NOTE [] DISCHARGE NOTE    [] [facility] [place]   [x] [place] [facility]    [] [facility]   [] [facility]    Date: 3/28/2024  Patient Name:  Yordan Nunn  : 1975  MRN: [id number]  CSN: [id number]    Referring Practitioner Norris Milton MD    Diagnosis Superior glenoid labrum lesion of left shoulder, initial encounter    Treatment Diagnosis M25.512  Left Shoulder Pain  R53.1 Weakness   Date of Evaluation 24    Additional Pertinent History  has a past medical history of GERD (gastroesophageal reflux disease) and Hiatal hernia.        Functional Outcome Measure Used SPADI   Functional Outcome Score 125 (24) Total SPADI score      Insurance: Primary: Payor: [employer] /  /  / ,   Secondary:    Authorization Information: PRE CERTIFICATION REQUIRED: No precert required.  INSURANCE THERAPY BENEFIT: NO LIMIT BASED ON MED NECESSITY. .   AQUATIC THERAPY COVERED: Yes  MODALITIES COVERED:  Yes   Approved Procedure Codes: Authorization of Specific CPT Codes Not Required  (Codes requested indicated by red font, codes approved indicated by black font)   Visit # 8, 10 for progress note (Reporting Period: 24 to     )          Visits Allowed:    Recertification Date: 24   Physician Follow-Up: ?   Physician Orders: Follow protocol: SAD, DCR, BT, no resistive elbow flexion x 6 weeks   History of Present Illness: Patient started in the left shoulder approximately 2 years ago.  Patient underwent left shoulder arthroscopic DCR, BT and debridement on 24.   Patient has been taking pain medication in addition to the pain pump.  Wife pulling the pain pump later today.       SUBJECTIVE: Patient eager to return to work. Complaint of catching during AROM flexion during full arc and during modifies partial arc.

## 2024-04-01 ENCOUNTER — HOSPITAL ENCOUNTER (OUTPATIENT)
Dept: OCCUPATIONAL THERAPY | Age: 49
Setting detail: THERAPIES SERIES
Discharge: HOME | End: 2024-04-01
Payer: COMMERCIAL

## 2024-04-01 PROCEDURE — 97140 MANUAL THERAPY 1/> REGIONS: CPT

## 2024-04-01 PROCEDURE — 97110 THERAPEUTIC EXERCISES: CPT

## 2024-04-01 NOTE — PROGRESS NOTES
pain following K-tape application and scapular mobilizations.       Areas for Improvement: impaired ROM, impaired strength, and pain  Prognosis: good    GOALS:  Patient Goal: return to previous functional level    Short Term Goals:  Time Frame: 4 weeks   Patient will increase left shoulder PROM to 100 degrees flexion and 35 degrees ER to increase ease and ability for dressing.  Patient will report pain with activity no greater than 3/10 to increase ease and ability to perform grooming task.  Patient will be independent with HEP for eventual return to work.      Long Term Goals:  Time Frame: 12 weeks   Patient will report sleeping without waking due to pain.  Patient will report performance of work duties with no greater than minimal difficulty.      Patient Education:   [x]  HEP/Education Completed: Plan of Care, Goals  [employer] Access Code for HEP: distal ROM, scapular pinches, table slides, pendulums  3/12/24: pendulums all directions. Reviewed protocol parameters   3/28/24: Cryotherapy- reviewed all protocols.  []  No new Education completed  []  Reviewed Prior HEP      [x]  Patient verbalized and/or demonstrated understanding of education provided.  []  Patient unable to verbalize and/or demonstrate understanding of education provided.  Will continue education.  [x]  Barriers to learning: none    PLAN:  Treatment Recommendations: Strengthening, Range of Motion, Manual Therapy - Soft Tissue Mobilization, Manual Therapy - Joint Manipulation, Home Exercise Program, Patient Education, Self-Care Education and Training, Integrative Dry Needling, and Modalities    []  Plan of care initiated.  Plan to see patient 2 times per week for 12 weeks to address the treatment planned outlined above.  [x]  Continue with current plan of care  []  Modify plan of care as follows:    []  Hold pending physician visit  []  Discharge    Time In 0826   Time Out 0906   Timed Code Minutes: 40 min   Total Treatment Time: 40 min

## 2024-04-04 ENCOUNTER — HOSPITAL ENCOUNTER (OUTPATIENT)
Dept: OCCUPATIONAL THERAPY | Age: 49
Setting detail: THERAPIES SERIES
Discharge: HOME | End: 2024-04-04
Payer: COMMERCIAL

## 2024-04-04 PROCEDURE — 97110 THERAPEUTIC EXERCISES: CPT

## 2024-04-04 NOTE — PROGRESS NOTES
[facility]  OCCUPATIONAL THERAPY  [] EVALUATION  [x] DAILY NOTE (LAND) [] DAILY NOTE (AQUATIC ) [] PROGRESS NOTE [] DISCHARGE NOTE    [] [facility] [place]   [x] [place] [facility]    [] [facility]   [] [facility]    Date: 2024  Patient Name:  Yordan Nunn  : 1975  MRN: [id number]  CSN: [id number]    Referring Practitioner Norris Milton MD    Diagnosis Superior glenoid labrum lesion of left shoulder, initial encounter    Treatment Diagnosis M25.512  Left Shoulder Pain  R53.1 Weakness   Date of Evaluation 24    Additional Pertinent History  has a past medical history of GERD (gastroesophageal reflux disease) and Hiatal hernia.        Functional Outcome Measure Used SPADI   Functional Outcome Score 125 (24) Total SPADI score      Insurance: Primary: Payor: [employer] /  /  / ,   Secondary:    Authorization Information: PRE CERTIFICATION REQUIRED: No precert required.  INSURANCE THERAPY BENEFIT: NO LIMIT BASED ON MED NECESSITY. .   AQUATIC THERAPY COVERED: Yes  MODALITIES COVERED:  Yes   Approved Procedure Codes: Authorization of Specific CPT Codes Not Required  (Codes requested indicated by red font, codes approved indicated by black font)   Visit # 10, 10/10 for progress note (Reporting Period: 24 to  24   )          Visits Allowed:    Recertification Date: 24   Physician Follow-Up: ?   Physician Orders: Follow protocol: SAD, DCR, BT, no resistive elbow flexion x 6 weeks   History of Present Illness: Patient started in the left shoulder approximately 2 years ago.  Patient underwent left shoulder arthroscopic DCR, BT and debridement on 24.   Patient has been taking pain medication in addition to the pain pump.  Wife pulling the pain pump later today.       SUBJECTIVE: Patient reports that he woke this morning without pain.  During pulleys he reported a pop, with a quick pain and then no

## 2024-04-08 ENCOUNTER — HOSPITAL ENCOUNTER (OUTPATIENT)
Dept: OCCUPATIONAL THERAPY | Age: 49
Setting detail: THERAPIES SERIES
Discharge: HOME | End: 2024-04-08
Payer: COMMERCIAL

## 2024-04-08 PROCEDURE — 97110 THERAPEUTIC EXERCISES: CPT

## 2024-04-08 PROCEDURE — 97035 APP MDLTY 1+ULTRASOUND EA 15: CPT

## 2024-04-08 NOTE — PROGRESS NOTES
[facility]  OCCUPATIONAL THERAPY  [] EVALUATION  [] DAILY NOTE (LAND) [] DAILY NOTE (AQUATIC ) [x] PROGRESS NOTE [] DISCHARGE NOTE    [] [facility] [place]   [x] [place] [facility]    [] [facility]   [] [facility]    Date: 2024  Patient Name:  Yordan Nunn  : 1975  MRN: [id number]  CSN: [id number]    Referring Practitioner Norris Milton MD    Diagnosis Superior glenoid labrum lesion of left shoulder, initial encounter    Treatment Diagnosis M25.512  Left Shoulder Pain  R53.1 Weakness   Date of Evaluation 24    Additional Pertinent History  has a past medical history of GERD (gastroesophageal reflux disease) and Hiatal hernia.        Functional Outcome Measure Used SPADI   Functional Outcome Score 125 (24) Total SPADI score      Insurance: Primary: Payor: [employer] /  /  / ,   Secondary:    Authorization Information: PRE CERTIFICATION REQUIRED: No precert required.  INSURANCE THERAPY BENEFIT: NO LIMIT BASED ON MED NECESSITY. .   AQUATIC THERAPY COVERED: Yes  MODALITIES COVERED:  Yes   Approved Procedure Codes: Authorization of Specific CPT Codes Not Required  (Codes requested indicated by red font, codes approved indicated by black font)   Visit # 10,  progress note 24 (Reporting Period: 24 to  24   )          Visits Allowed:    Recertification Date: 24   Physician Follow-Up: ?   Physician Orders: Follow protocol: SAD, DCR, BT, no resistive elbow flexion x 6 weeks   History of Present Illness: Patient started in the left shoulder approximately 2 years ago.  Patient underwent left shoulder arthroscopic DCR, BT and debridement on 24.   Patient has been taking pain medication in addition to the pain pump.  Wife pulling the pain pump later today.       SUBJECTIVE: Patient reports that his shoulder is feeling much better.  Reports that he removed the tape over the weekend, and his pain

## 2024-04-11 ENCOUNTER — HOSPITAL ENCOUNTER (OUTPATIENT)
Dept: OCCUPATIONAL THERAPY | Age: 49
Setting detail: THERAPIES SERIES
Discharge: HOME | End: 2024-04-11
Payer: COMMERCIAL

## 2024-04-11 PROCEDURE — 97035 APP MDLTY 1+ULTRASOUND EA 15: CPT

## 2024-04-11 PROCEDURE — 97110 THERAPEUTIC EXERCISES: CPT

## 2024-04-11 NOTE — PROGRESS NOTES
continue education.  [x]  Barriers to learning: none    PLAN:  Treatment Recommendations: Strengthening, Range of Motion, Manual Therapy - Soft Tissue Mobilization, Manual Therapy - Joint Manipulation, Home Exercise Program, Patient Education, Self-Care Education and Training, Integrative Dry Needling, and Modalities    []  Plan of care initiated.  Plan to see patient 2 times per week for 12 weeks to address the treatment planned outlined above.  [x]  Continue with current plan of care  []  Modify plan of care as follows:    []  Hold pending physician visit  []  Discharge    Time In 1500   Time Out 1545   Timed Code Minutes: 45 min   Total Treatment Time: 45 min       Electronically Signed by: Treatment and documentation completed by Paula Plata S/OT in collaboration and with direct supervision by Mary Monique, FRANTZ, OTR/L [id number]

## 2024-04-15 ENCOUNTER — HOSPITAL ENCOUNTER (OUTPATIENT)
Dept: OCCUPATIONAL THERAPY | Age: 49
Setting detail: THERAPIES SERIES
Discharge: HOME | End: 2024-04-15
Payer: COMMERCIAL

## 2024-04-15 PROCEDURE — 97110 THERAPEUTIC EXERCISES: CPT

## 2024-04-15 NOTE — PROGRESS NOTES
[facility]  OCCUPATIONAL THERAPY  [] EVALUATION  [x] DAILY NOTE (LAND) [] DAILY NOTE (AQUATIC ) [] PROGRESS NOTE [] DISCHARGE NOTE    [] [facility] [place]   [x] [place] [facility]    [] [facility]   [] [facility]    Date: 4/15/2024  Patient Name:  Yordan Nunn  : 1975  MRN: [id number]  CSN: [id number]    Referring Practitioner Norris Milton MD    Diagnosis Superior glenoid labrum lesion of left shoulder, initial encounter    Treatment Diagnosis M25.512  Left Shoulder Pain  R53.1 Weakness   Date of Evaluation 24    Additional Pertinent History  has a past medical history of GERD (gastroesophageal reflux disease) and Hiatal hernia.        Functional Outcome Measure Used SPADI   Functional Outcome Score 125 (24) Total SPADI score      Insurance: Primary: Payor: [employer] /  /  / ,   Secondary:    Authorization Information: PRE CERTIFICATION REQUIRED: No precert required.  INSURANCE THERAPY BENEFIT: NO LIMIT BASED ON MED NECESSITY. .   AQUATIC THERAPY COVERED: Yes  MODALITIES COVERED:  Yes   Approved Procedure Codes: Authorization of Specific CPT Codes Not Required  (Codes requested indicated by red font, codes approved indicated by black font)   Visit # 13,  3/10     progress note 24 (Reporting Period: 24 to 24)          Visits Allowed:    Recertification Date: 24   Physician Follow-Up: 24?   Physician Orders: Follow protocol: SAD, DCR, BT, no resistive elbow flexion x 6 weeks   History of Present Illness: Patient started in the left shoulder approximately 2 years ago.  Patient underwent left shoulder arthroscopic DCR, BT and debridement on 24.   Patient has been taking pain medication in addition to the pain pump.  Wife pulling the pain pump later today.       SUBJECTIVE: Patient reports that he has had increased pain/soreness since returning to work this week. He does state he feels

## 2024-04-18 ENCOUNTER — HOSPITAL ENCOUNTER (OUTPATIENT)
Dept: OCCUPATIONAL THERAPY | Age: 49
Setting detail: THERAPIES SERIES
Discharge: HOME | End: 2024-04-18
Payer: COMMERCIAL

## 2024-04-18 PROCEDURE — 97110 THERAPEUTIC EXERCISES: CPT

## 2024-04-18 NOTE — PROGRESS NOTES
fatigue  []  Patient limited by pain   []  Patient limited by other medical complications  []  Other:     Assessment:  Patient with good progress toward goals. He presents with minimal pain but continues to have tightness especially in ER. Pt responds well to progressive strengthening exercises and demonstrates good understanding for addition to home program. Continue with current POC.  Areas for Improvement: impaired ROM, impaired strength, and pain  Prognosis: good    GOALS:  Patient Goal: return to previous functional level    Short Term Goals:  Time Frame: 4 weeks   Patient will increase left shoulder PROM to 100 degrees flexion and 35 degrees ER to increase ease and ability for dressing.  GOAL MET  160 degrees flexion, 176 degrees abduction, 80 degrees ER and 76 degrees IR.   REVISE Patient will increase left shoulder PROM to 170 degrees flexion, and 85 degrees IR to increase ease and ability for work tasks.    Patient will report pain with activity no greater than 3/10 to increase ease and ability to perform grooming task.  GOAL NOT MET CONTINUE  Patient will be independent with HEP for eventual return to work.  GOAL MET CONTINUE WITH UPGRADES  NEW GOAL: Patient will increase left shoulder AROM to 155 degrees flexion, 90 degrees abduction, 60 degrees ER in abduction to increase ability to perform work tasks.        Long Term Goals:  Time Frame: 12 weeks   Patient will report sleeping without waking due to pain.   GOAL MET DISCONTINUE  Patient will report performance of work duties with no greater than minimal difficulty.  GOAL NOT MET not returned to work yet CONTINUE      Patient Education:   [x]  HEP/Education Completed: Plan of Care, Goals  [facility] Access Code for HEP: distal ROM, scapular pinches, table slides, pendulums  3/12/24: pendulums all directions. Reviewed protocol parameters   3/28/24: Cryotherapy- reviewed all protocols.  04/15/24: Green band: supine horizontal abduction, extension, ER.  []

## 2024-04-23 ENCOUNTER — HOSPITAL ENCOUNTER (OUTPATIENT)
Dept: OCCUPATIONAL THERAPY | Age: 49
Setting detail: THERAPIES SERIES
Discharge: HOME | End: 2024-04-23
Payer: COMMERCIAL

## 2024-04-23 PROCEDURE — 97110 THERAPEUTIC EXERCISES: CPT

## 2024-04-23 NOTE — PROGRESS NOTES
good progress toward goals. Pt responds well to progressive strengthening exercises and demonstrates good understanding for addition to home program. Continue with current POC.  Areas for Improvement: impaired ROM, impaired strength, and pain  Prognosis: good    GOALS:  Patient Goal: return to previous functional level    Short Term Goals:  Time Frame: 4 weeks   Patient will increase left shoulder PROM to 100 degrees flexion and 35 degrees ER to increase ease and ability for dressing.  GOAL MET  160 degrees flexion, 176 degrees abduction, 80 degrees ER and 76 degrees IR.   REVISE Patient will increase left shoulder PROM to 170 degrees flexion, and 85 degrees IR to increase ease and ability for work tasks.    Patient will report pain with activity no greater than 3/10 to increase ease and ability to perform grooming task.  GOAL NOT MET CONTINUE  Patient will be independent with HEP for eventual return to work.  GOAL MET CONTINUE WITH UPGRADES  NEW GOAL: Patient will increase left shoulder AROM to 155 degrees flexion, 90 degrees abduction, 60 degrees ER in abduction to increase ability to perform work tasks.        Long Term Goals:  Time Frame: 12 weeks   Patient will report sleeping without waking due to pain.   GOAL MET DISCONTINUE  Patient will report performance of work duties with no greater than minimal difficulty.  GOAL NOT MET not returned to work yet CONTINUE      Patient Education:   [x]  HEP/Education Completed: Plan of Care, Goals  [employer] Access Code for HEP: distal ROM, scapular pinches, table slides, pendulums  3/12/24: pendulums all directions. Reviewed protocol parameters   3/28/24: Cryotherapy- reviewed all protocols.  04/15/24: Green band: supine horizontal abduction, extension, ER.  []  No new Education completed  []  Reviewed Prior HEP      [x]  Patient verbalized and/or demonstrated understanding of education provided.  []  Patient unable to verbalize and/or demonstrate understanding of

## 2024-04-25 ENCOUNTER — HOSPITAL ENCOUNTER (OUTPATIENT)
Dept: OCCUPATIONAL THERAPY | Age: 49
Setting detail: THERAPIES SERIES
Discharge: HOME | End: 2024-04-25
Payer: COMMERCIAL

## 2024-04-25 PROCEDURE — 97110 THERAPEUTIC EXERCISES: CPT

## 2024-04-25 NOTE — PROGRESS NOTES
limited by fatigue  []  Patient limited by pain   []  Patient limited by other medical complications  []  Other:     Assessment:  Patient with good progress toward goals. Pt responds well to progressive strengthening exercises, but continues to not have returned to PLOF with all daily activities including work duties. Continue with current POC.  Areas for Improvement: impaired ROM, impaired strength, and pain  Prognosis: good    GOALS:  Patient Goal: return to previous functional level    Short Term Goals:  Time Frame: 4 weeks   Patient will increase left shoulder PROM to 100 degrees flexion and 35 degrees ER to increase ease and ability for dressing.  GOAL MET  160 degrees flexion, 176 degrees abduction, 80 degrees ER and 76 degrees IR.   REVISE Patient will increase left shoulder PROM to 170 degrees flexion, and 85 degrees IR to increase ease and ability for work tasks.    Patient will report pain with activity no greater than 3/10 to increase ease and ability to perform grooming task.  GOAL NOT MET CONTINUE  Patient will be independent with HEP for eventual return to work.  GOAL MET CONTINUE WITH UPGRADES  NEW GOAL: Patient will increase left shoulder AROM to 155 degrees flexion, 90 degrees abduction, 60 degrees ER in abduction to increase ability to perform work tasks.        Long Term Goals:  Time Frame: 12 weeks   Patient will report sleeping without waking due to pain.   GOAL MET DISCONTINUE  Patient will report performance of work duties with no greater than minimal difficulty.  GOAL NOT MET not returned to work yet CONTINUE      Patient Education:   [x]  HEP/Education Completed: Plan of Care, Goals  [facility] Access Code for HEP: distal ROM, scapular pinches, table slides, pendulums  3/12/24: pendulums all directions. Reviewed protocol parameters   3/28/24: Cryotherapy- reviewed all protocols.  04/15/24: Green band: supine horizontal abduction, extension, ER.  []  No new Education completed  []  Reviewed

## 2024-04-30 ENCOUNTER — HOSPITAL ENCOUNTER (OUTPATIENT)
Dept: OCCUPATIONAL THERAPY | Age: 49
Setting detail: THERAPIES SERIES
Discharge: HOME | End: 2024-04-30
Payer: COMMERCIAL

## 2024-04-30 PROCEDURE — 97110 THERAPEUTIC EXERCISES: CPT

## 2024-04-30 NOTE — PROGRESS NOTES
[facility]  OCCUPATIONAL THERAPY  [] EVALUATION  [] DAILY NOTE (LAND) [] DAILY NOTE (AQUATIC ) [x] PROGRESS NOTE [] DISCHARGE NOTE    [] [facility] [place]   [x] [place] [facility]    [] [facility]   [] [facility]    Date: 2024  Patient Name:  Yordan Nunn  : 1975  MRN: [id number]  CSN: [id number]    Referring Practitioner Norris Milton MD    Diagnosis Superior glenoid labrum lesion of left shoulder, initial encounter    Treatment Diagnosis M25.512  Left Shoulder Pain  R53.1 Weakness   Date of Evaluation 24    Additional Pertinent History  has a past medical history of GERD (gastroesophageal reflux disease) and Hiatal hernia.        Functional Outcome Measure Used SPADI   Functional Outcome Score 125 (24) Total SPADI score 50 (24)      Insurance: Primary: Payor: [employer] /  /  / ,   Secondary:    Authorization Information: PRE CERTIFICATION REQUIRED: No precert required.  INSURANCE THERAPY BENEFIT: NO LIMIT BASED ON MED NECESSITY. .   AQUATIC THERAPY COVERED: Yes  MODALITIES COVERED:  Yes   Approved Procedure Codes: Authorization of Specific CPT Codes Not Required  (Codes requested indicated by red font, codes approved indicated by black font)   Visit # 17,  7/10     progress note 24 (Reporting Period: 24 to 24)          Visits Allowed:    Recertification Date: 24   Physician Follow-Up: 24?   Physician Orders: Follow protocol: SAD, DCR, BT, no resistive elbow flexion x 6 weeks   History of Present Illness: Patient started in the left shoulder approximately 2 years ago.  Patient underwent left shoulder arthroscopic DCR, BT and debridement on 24.   Patient has been taking pain medication in addition to the pain pump.  Wife pulling the pain pump later today.       SUBJECTIVE: Patient reports overall he feels his strength is returning, however, he still has pain with overhead

## 2024-05-02 ENCOUNTER — HOSPITAL ENCOUNTER (OUTPATIENT)
Dept: OCCUPATIONAL THERAPY | Age: 49
Setting detail: THERAPIES SERIES
Discharge: HOME | End: 2024-05-02
Payer: COMMERCIAL

## 2024-05-02 PROCEDURE — 97110 THERAPEUTIC EXERCISES: CPT

## 2024-05-02 NOTE — PROGRESS NOTES
[facility]  OCCUPATIONAL THERAPY  [] EVALUATION  [x] DAILY NOTE (LAND) [] DAILY NOTE (AQUATIC ) [] PROGRESS NOTE [] DISCHARGE NOTE    [] [facility] [place]   [x] [place] [facility]    [] [facility]   [] [facility]    Date: 2024  Patient Name:  Yordan Nunn  : 1975  MRN: [id number]  CSN: [id number]    Referring Practitioner Norris Milton MD    Diagnosis Superior glenoid labrum lesion of left shoulder, initial encounter    Treatment Diagnosis M25.512  Left Shoulder Pain  R53.1 Weakness   Date of Evaluation 24    Additional Pertinent History  has a past medical history of GERD (gastroesophageal reflux disease) and Hiatal hernia.        Functional Outcome Measure Used SPADI   Functional Outcome Score 125 (24) Total SPADI score 50 (24)      Insurance: Primary: Payor: [employer] /  /  / ,   Secondary:    Authorization Information: PRE CERTIFICATION REQUIRED: No precert required.  INSURANCE THERAPY BENEFIT: NO LIMIT BASED ON MED NECESSITY. .   AQUATIC THERAPY COVERED: Yes  MODALITIES COVERED:  Yes   Approved Procedure Codes: Authorization of Specific CPT Codes Not Required  (Codes requested indicated by red font, codes approved indicated by black font)   Visit # 18,  8/10     progress note 24 (Reporting Period: 24 to 24)          Visits Allowed:    Recertification Date: 24   Physician Follow-Up: 24?   Physician Orders: Follow protocol: SAD, DCR, BT, no resistive elbow flexion x 6 weeks   History of Present Illness: Patient started in the left shoulder approximately 2 years ago.  Patient underwent left shoulder arthroscopic DCR, BT and debridement on 24.   Patient has been taking pain medication in addition to the pain pump.  Wife pulling the pain pump later today.       SUBJECTIVE: Patient reports that his shoulder tends to catch a lot.  Reports that he had increased pain Tuesday

## 2024-05-13 ENCOUNTER — HOSPITAL ENCOUNTER (OUTPATIENT)
Dept: OCCUPATIONAL THERAPY | Age: 49
Setting detail: THERAPIES SERIES
Discharge: HOME | End: 2024-05-13
Payer: COMMERCIAL

## 2024-05-13 PROCEDURE — 97110 THERAPEUTIC EXERCISES: CPT

## 2024-05-13 NOTE — DISCHARGE SUMMARY
[facility]  OCCUPATIONAL THERAPY  [] EVALUATION  [] DAILY NOTE (LAND) [] DAILY NOTE (AQUATIC ) [] PROGRESS NOTE [x] DISCHARGE NOTE    [] [facility] [place]   [x] [facility]    [] [facility]   [] [facility]    Date: 2024  Patient Name:  Yordan Nunn  : 1975  MRN: [id number]  CSN: [id number]    Referring Practitioner Norris Milton MD    Diagnosis Superior glenoid labrum lesion of left shoulder, initial encounter    Treatment Diagnosis M25.512  Left Shoulder Pain  R53.1 Weakness   Date of Evaluation 24    Additional Pertinent History  has a past medical history of GERD (gastroesophageal reflux disease) and Hiatal hernia.        Functional Outcome Measure Used SPADI   Functional Outcome Score 125 (24) Total SPADI score 50 (24); 9 (24)      Insurance: Primary: Payor: [employer] /  /  / ,   Secondary:    Authorization Information: PRE CERTIFICATION REQUIRED: No precert required.  INSURANCE THERAPY BENEFIT: NO LIMIT BASED ON MED NECESSITY. .   AQUATIC THERAPY COVERED: Yes  MODALITIES COVERED:  Yes   Approved Procedure Codes: Authorization of Specific CPT Codes Not Required  (Codes requested indicated by red font, codes approved indicated by black font)   Visit # 19,  9/10     progress note 24 (Reporting Period: 24 to 24)          Visits Allowed:    Recertification Date: 24   Physician Follow-Up: 24?   Physician Orders: Follow protocol: SAD, DCR, BT, no resistive elbow flexion x 6 weeks   History of Present Illness: Patient started in the left shoulder approximately 2 years ago.  Patient underwent left shoulder arthroscopic DCR, BT and debridement on 24.   Patient has been taking pain medication in addition to the pain pump.  Wife pulling the pain pump later today.       SUBJECTIVE: Patient reports he has had 0/10 over the weekend. Reports his home program is going

## 2025-03-17 ASSESSMENT — PATIENT HEALTH QUESTIONNAIRE - PHQ9
SUM OF ALL RESPONSES TO PHQ QUESTIONS 1-9: 0
SUM OF ALL RESPONSES TO PHQ QUESTIONS 1-9: 0
2. FEELING DOWN, DEPRESSED OR HOPELESS: NOT AT ALL
2. FEELING DOWN, DEPRESSED OR HOPELESS: NOT AT ALL
SUM OF ALL RESPONSES TO PHQ9 QUESTIONS 1 & 2: 0
1. LITTLE INTEREST OR PLEASURE IN DOING THINGS: NOT AT ALL
1. LITTLE INTEREST OR PLEASURE IN DOING THINGS: NOT AT ALL
SUM OF ALL RESPONSES TO PHQ QUESTIONS 1-9: 0
SUM OF ALL RESPONSES TO PHQ QUESTIONS 1-9: 0

## 2025-03-18 ENCOUNTER — HOSPITAL ENCOUNTER (EMERGENCY)
Age: 50
Discharge: HOME | End: 2025-03-18
Payer: COMMERCIAL

## 2025-03-18 ENCOUNTER — APPOINTMENT (OUTPATIENT)
Dept: GENERAL RADIOLOGY | Age: 50
End: 2025-03-18
Payer: COMMERCIAL

## 2025-03-18 VITALS
HEART RATE: 98 BPM | RESPIRATION RATE: 20 BRPM | TEMPERATURE: 97.8 F | DIASTOLIC BLOOD PRESSURE: 95 MMHG | SYSTOLIC BLOOD PRESSURE: 139 MMHG | OXYGEN SATURATION: 98 % | HEIGHT: 66 IN | WEIGHT: 253 LBS | BODY MASS INDEX: 40.66 KG/M2

## 2025-03-18 DIAGNOSIS — J40 BRONCHITIS: Primary | ICD-10-CM

## 2025-03-18 PROCEDURE — 99213 OFFICE O/P EST LOW 20 MIN: CPT | Performed by: EMERGENCY MEDICINE

## 2025-03-18 PROCEDURE — 99213 OFFICE O/P EST LOW 20 MIN: CPT

## 2025-03-18 PROCEDURE — 71046 X-RAY EXAM CHEST 2 VIEWS: CPT

## 2025-03-18 RX ORDER — PREDNISONE 20 MG/1
40 TABLET ORAL DAILY
Qty: 10 TABLET | Refills: 0 | Status: SHIPPED | OUTPATIENT
Start: 2025-03-18 | End: 2025-03-23

## 2025-03-18 RX ORDER — LORATADINE 10 MG/1
10 TABLET ORAL DAILY
COMMUNITY

## 2025-03-18 RX ORDER — LOSARTAN POTASSIUM 25 MG/1
25 TABLET ORAL DAILY
COMMUNITY
End: 2025-03-21 | Stop reason: SDUPTHER

## 2025-03-18 RX ORDER — ROSUVASTATIN CALCIUM 5 MG/1
5 TABLET, COATED ORAL DAILY
COMMUNITY
End: 2025-03-21 | Stop reason: SDUPTHER

## 2025-03-18 RX ORDER — BROMPHENIRAMINE MALEATE, PSEUDOEPHEDRINE HYDROCHLORIDE, AND DEXTROMETHORPHAN HYDROBROMIDE 2; 30; 10 MG/5ML; MG/5ML; MG/5ML
10 SYRUP ORAL 4 TIMES DAILY PRN
Qty: 180 ML | Refills: 0 | Status: SHIPPED | OUTPATIENT
Start: 2025-03-18

## 2025-03-18 SDOH — ECONOMIC STABILITY: INCOME INSECURITY: IN THE LAST 12 MONTHS, WAS THERE A TIME WHEN YOU WERE NOT ABLE TO PAY THE MORTGAGE OR RENT ON TIME?: NO

## 2025-03-18 SDOH — ECONOMIC STABILITY: FOOD INSECURITY: WITHIN THE PAST 12 MONTHS, YOU WORRIED THAT YOUR FOOD WOULD RUN OUT BEFORE YOU GOT MONEY TO BUY MORE.: NEVER TRUE

## 2025-03-18 SDOH — ECONOMIC STABILITY: FOOD INSECURITY: WITHIN THE PAST 12 MONTHS, THE FOOD YOU BOUGHT JUST DIDN'T LAST AND YOU DIDN'T HAVE MONEY TO GET MORE.: NEVER TRUE

## 2025-03-18 SDOH — ECONOMIC STABILITY: TRANSPORTATION INSECURITY
IN THE PAST 12 MONTHS, HAS THE LACK OF TRANSPORTATION KEPT YOU FROM MEDICAL APPOINTMENTS OR FROM GETTING MEDICATIONS?: NO

## 2025-03-18 SDOH — ECONOMIC STABILITY: TRANSPORTATION INSECURITY
IN THE PAST 12 MONTHS, HAS LACK OF TRANSPORTATION KEPT YOU FROM MEETINGS, WORK, OR FROM GETTING THINGS NEEDED FOR DAILY LIVING?: NO

## 2025-03-18 ASSESSMENT — PAIN DESCRIPTION - PAIN TYPE: TYPE: ACUTE PAIN

## 2025-03-18 ASSESSMENT — PAIN DESCRIPTION - LOCATION: LOCATION: CHEST

## 2025-03-18 ASSESSMENT — ENCOUNTER SYMPTOMS
RHINORRHEA: 1
COUGH: 1
ABDOMINAL PAIN: 0
SHORTNESS OF BREATH: 1
CHEST TIGHTNESS: 1
WHEEZING: 0

## 2025-03-18 ASSESSMENT — PAIN SCALES - GENERAL: PAINLEVEL_OUTOF10: 5

## 2025-03-18 ASSESSMENT — PAIN DESCRIPTION - ONSET: ONSET: ON-GOING

## 2025-03-18 ASSESSMENT — PAIN - FUNCTIONAL ASSESSMENT
PAIN_FUNCTIONAL_ASSESSMENT: 0-10
PAIN_FUNCTIONAL_ASSESSMENT: ACTIVITIES ARE NOT PREVENTED

## 2025-03-18 ASSESSMENT — PAIN DESCRIPTION - FREQUENCY: FREQUENCY: CONTINUOUS

## 2025-03-18 ASSESSMENT — PAIN DESCRIPTION - DESCRIPTORS: DESCRIPTORS: DISCOMFORT

## 2025-03-18 NOTE — ED TRIAGE NOTES
Pt to urgent care due to continued cough and sinus congestion. Pt was treated for influenza and walking pneumonia, but states it helped only while using. He states last week his symptoms started again and he feels sob.

## 2025-03-18 NOTE — ED NOTES
Discharge instructions and prescriptions reviewed with pt. Pt verbalized understanding. Pt ambulated out in stable condition.  Assessment unchanged upon discharge.     Carlota Agosto RN  03/18/25 [id number]

## 2025-03-18 NOTE — DISCHARGE INSTRUCTIONS
Prednisone as directed    Bromfed as directed as needed for cough/congestion    Continue your albuterol inhaler  Follow-up with your primary care provider on Friday as scheduled    Go to the emergency department for worsening sensation of shortness of breath, chest pain or development of any new symptoms

## 2025-03-18 NOTE — ED PROVIDER NOTES
[facility] EMERGENCY DEPARTMENT  Urgent Care Encounter       CHIEF COMPLAINT       Chief Complaint   Patient presents with    Cough    Sinusitis       Nurses Notes reviewed and I agree except as noted in the HPI.  HISTORY OF PRESENT ILLNESS   Yordan Nunn is a 49 y.o. male who presents for complaints of cough, chest tightness and mild shortness of breath.  Symptoms have been present for approximately 5 days.  Patient states at first he thought this was allergy related and has been taking allergy medications but this is not helping.  Patient has an albuterol inhaler that he has from previous illness in February.  He states he has tried this without any significant improvement.  States his chest feels tight but he does not have chest pain.  His symptoms have increased today.  No recorded fevers.  No hemoptysis.  He does have sinus congestion and drainage.    Patient denies any recent travel or surgeries, no history of blood clots, no leg pain or swelling, no hormone replacement therapy.    HPI    REVIEW OF SYSTEMS     Review of Systems   Constitutional:  Negative for activity change, fatigue and fever.   HENT:  Positive for congestion, postnasal drip and rhinorrhea.    Respiratory:  Positive for cough, chest tightness and shortness of breath. Negative for wheezing.    Cardiovascular:  Negative for chest pain, palpitations and leg swelling.   Gastrointestinal:  Negative for abdominal pain.   Neurological:  Negative for headaches.       PAST MEDICAL HISTORY         Diagnosis Date    GERD (gastroesophageal reflux disease)     Hiatal hernia        SURGICALHISTORY     Patient  has a past surgical history that includes Tonsillectomy (2011); Adenoidectomy; Breast lumpectomy (2004); Upper gastrointestinal endoscopy (x2); hernia repair; Gastric fundoplication (10/2017); and Upper gastrointestinal endoscopy (N/A, 2/15/2019).    CURRENT MEDICATIONS       Previous Medications    LORATADINE (CLARITIN) 10  COURSE:     Vitals:    03/18/25 1231   BP: (!) 139/95   Pulse: 98   Resp: 20   Temp: 97.8 °F (36.6 °C)   TempSrc: Temporal   SpO2: 98%   Weight: 114.8 kg (253 lb)   Height: 1.676 m (5' 6\")       Medications - No data to display         PROCEDURES:  None    FINAL IMPRESSION      1. Bronchitis          DISPOSITION/ PLAN     Patient presents for what is likely acute bronchitis.  Chest x-ray negative.  Patient has no history to be concern for pulmonary embolism.  He will be discharged home with prednisone and Bromfed for treatment of his current symptoms.  He is advised to drink plenty water.  Follow-up with his family physician in 3 days as scheduled.  Go to the emergency department for the development of any worsening shortness of breath, chest pain or tightness, new or worsening symptoms.  Patient verbalized understanding of all instructions.      PATIENT REFERRED TO:  Isaura Howard MD  [address] / [facility] [address]      DISCHARGE MEDICATIONS:  New Prescriptions    BROMPHENIRAMINE-PSEUDOEPHEDRINE-DM 2-30-10 MG/5ML SYRUP    Take 10 mLs by mouth 4 times daily as needed for Congestion or Cough    PREDNISONE (DELTASONE) 20 MG TABLET    Take 2 tablets by mouth daily for 5 days       Discontinued Medications    No medications on file       Current Discharge Medication List          KAYLA Jurado CNP    (Please note that portions of this note were completed with a voice recognition program. Efforts were made to edit the dictations but occasionally words are mis-transcribed.)           René Dhillon APRN - CNP  03/18/25 [id number]

## 2025-03-21 ENCOUNTER — OFFICE VISIT (OUTPATIENT)
Dept: FAMILY MEDICINE CLINIC | Age: 50
End: 2025-03-21
Payer: COMMERCIAL

## 2025-03-21 VITALS
SYSTOLIC BLOOD PRESSURE: 136 MMHG | OXYGEN SATURATION: 99 % | WEIGHT: 254 LBS | BODY MASS INDEX: 40.82 KG/M2 | RESPIRATION RATE: 16 BRPM | HEART RATE: 112 BPM | HEIGHT: 66 IN | TEMPERATURE: 98.4 F | DIASTOLIC BLOOD PRESSURE: 78 MMHG

## 2025-03-21 DIAGNOSIS — I10 PRIMARY HYPERTENSION: Primary | ICD-10-CM

## 2025-03-21 DIAGNOSIS — E66.813 CLASS 3 SEVERE OBESITY DUE TO EXCESS CALORIES WITHOUT SERIOUS COMORBIDITY WITH BODY MASS INDEX (BMI) OF 40.0 TO 44.9 IN ADULT: ICD-10-CM

## 2025-03-21 DIAGNOSIS — E66.01 CLASS 3 SEVERE OBESITY DUE TO EXCESS CALORIES WITHOUT SERIOUS COMORBIDITY WITH BODY MASS INDEX (BMI) OF 40.0 TO 44.9 IN ADULT: ICD-10-CM

## 2025-03-21 DIAGNOSIS — E78.00 PURE HYPERCHOLESTEROLEMIA: ICD-10-CM

## 2025-03-21 DIAGNOSIS — R73.09 ELEVATED GLUCOSE: ICD-10-CM

## 2025-03-21 PROCEDURE — 3075F SYST BP GE 130 - 139MM HG: CPT | Performed by: FAMILY MEDICINE

## 2025-03-21 PROCEDURE — G8427 DOCREV CUR MEDS BY ELIG CLIN: HCPCS | Performed by: FAMILY MEDICINE

## 2025-03-21 PROCEDURE — 4004F PT TOBACCO SCREEN RCVD TLK: CPT | Performed by: FAMILY MEDICINE

## 2025-03-21 PROCEDURE — 99214 OFFICE O/P EST MOD 30 MIN: CPT | Performed by: FAMILY MEDICINE

## 2025-03-21 PROCEDURE — G8417 CALC BMI ABV UP PARAM F/U: HCPCS | Performed by: FAMILY MEDICINE

## 2025-03-21 PROCEDURE — 3078F DIAST BP <80 MM HG: CPT | Performed by: FAMILY MEDICINE

## 2025-03-21 RX ORDER — ROSUVASTATIN CALCIUM 5 MG/1
5 TABLET, COATED ORAL DAILY
Qty: 90 TABLET | Refills: 3 | Status: SHIPPED | OUTPATIENT
Start: 2025-03-21

## 2025-03-21 RX ORDER — LOSARTAN POTASSIUM 25 MG/1
25 TABLET ORAL DAILY
Qty: 90 TABLET | Refills: 3 | Status: SHIPPED | OUTPATIENT
Start: 2025-03-21

## 2025-03-21 ASSESSMENT — ENCOUNTER SYMPTOMS
WHEEZING: 1
COUGH: 1
SHORTNESS OF BREATH: 1
ABDOMINAL PAIN: 0

## 2025-03-21 NOTE — PROGRESS NOTES
Recommend weight loss  - Comprehensive Metabolic Panel; Future    4. Elevated glucose  -May have prediabetes  - Hemoglobin A1C; Future    Obtain results of previous colonoscopy    They voiced understanding.  All questions answered. They agreed with treatment plan.   See patient instructions for any educational materials that may have been given.  Discussed use, benefit, and side effects of prescribed medications.     Reviewed health maintenance.    (Please note that portions of this note may have been completed with a voice recognition program.  Efforts were made to edit the dictation but occasionally words are mis-transcribed.)    Return in about 3 months (around 6/21/2025) for Well.      Electronically signed by Julien Hastings MD on 3/21/2025 at 7:48 AM

## 2025-03-22 ENCOUNTER — RESULTS FOLLOW-UP (OUTPATIENT)
Dept: FAMILY MEDICINE CLINIC | Age: 50
End: 2025-03-22

## 2025-03-22 LAB
ALBUMIN: 3.9 G/DL (ref 3.5–5.2)
ALK PHOSPHATASE: 62 U/L (ref 39–118)
ALT SERPL-CCNC: 33 U/L (ref 5–41)
ANION GAP SERPL CALCULATED.3IONS-SCNC: 15 MMOL/L (ref 7–16)
AST SERPL-CCNC: 24 U/L (ref 9–50)
BASOPHILS ABSOLUTE: 0.02 K/UL (ref 0–0.2)
BASOPHILS RELATIVE PERCENT: 0.2 % (ref 0–2)
BILIRUB SERPL-MCNC: 0.4 MG/DL
BUN BLDV-MCNC: 18 MG/DL (ref 6–20)
CALCIUM SERPL-MCNC: 9.2 MG/DL (ref 8.6–10.5)
CHLORIDE BLD-SCNC: 100 MMOL/L (ref 96–107)
CHOLESTEROL, TOTAL: 153 MG/DL (ref 100–199)
CHOLESTEROL/HDL RATIO: 4.1 (ref 2–4.5)
CO2: 22 MMOL/L (ref 18–32)
CREAT SERPL-MCNC: 0.88 MG/DL (ref 0.67–1.3)
EGFR IF NONAFRICAN AMERICAN: 107 ML/MIN/1.73M2
EOSINOPHILS ABSOLUTE: 0.01 K/UL (ref 0–0.8)
EOSINOPHILS RELATIVE PERCENT: 0.1 % (ref 0–5)
ESTIMATED AVERAGE GLUCOSE: 114 MG/DL
GLUCOSE: 98 MG/DL (ref 70–100)
HBA1C MFR BLD: 5.6 %
HCT VFR BLD CALC: 43.8 % (ref 39–52)
HDLC SERPL-MCNC: 37 MG/DL
HEMOGLOBIN: 15 G/DL (ref 13–18)
IMMATURE GRANS (ABS): 0.05 K/UL (ref 0–0.06)
IMMATURE GRANULOCYTES %: 0.5 % (ref 0–2)
LDL CHOLESTEROL: 80 MG/DL
LDL/HDL RATIO: 2.2
LYMPHOCYTES ABSOLUTE: 1.22 K/UL (ref 0.9–5.2)
LYMPHOCYTES RELATIVE PERCENT: 12.1 % (ref 20–45)
MCH RBC QN AUTO: 31.3 PG (ref 26–32)
MCHC RBC AUTO-ENTMCNC: 34.2 G/DL (ref 32–35)
MCV RBC AUTO: 91 FL (ref 75–100)
MONOCYTES ABSOLUTE: 0.44 K/UL (ref 0.1–1)
MONOCYTES RELATIVE PERCENT: 4.4 % (ref 0–13)
NEUTROPHILS ABSOLUTE: 8.34 K/UL (ref 1.9–8)
NEUTROPHILS RELATIVE PERCENT: 82.7 % (ref 45–75)
PDW BLD-RTO: 12.5 % (ref 11.2–14.8)
PLATELET # BLD: 270 THOUS/CMM (ref 140–440)
POTASSIUM SERPL-SCNC: 4 MMOL/L (ref 3.5–5.4)
RBC # BLD: 4.79 MILL/CMM (ref 4.4–6.1)
SODIUM BLD-SCNC: 137 MMOL/L (ref 135–148)
TOTAL PROTEIN: 7.4 G/DL (ref 6–8.3)
TRIGL SERPL-MCNC: 182 MG/DL (ref 20–149)
TSH SERPL DL<=0.05 MIU/L-ACNC: 0.69 UIU/ML (ref 0.27–4.2)
VLDLC SERPL CALC-MCNC: 36 MG/DL
WBC # BLD: 10.1 THDS/CMM (ref 3.6–11)

## 2025-03-25 ENCOUNTER — OFFICE VISIT (OUTPATIENT)
Dept: FAMILY MEDICINE CLINIC | Age: 50
End: 2025-03-25
Payer: COMMERCIAL

## 2025-03-25 VITALS
SYSTOLIC BLOOD PRESSURE: 132 MMHG | DIASTOLIC BLOOD PRESSURE: 80 MMHG | WEIGHT: 255 LBS | TEMPERATURE: 97.1 F | HEIGHT: 66 IN | BODY MASS INDEX: 40.98 KG/M2 | HEART RATE: 94 BPM | OXYGEN SATURATION: 96 %

## 2025-03-25 DIAGNOSIS — B37.0 ORAL THRUSH: Primary | ICD-10-CM

## 2025-03-25 PROCEDURE — 3075F SYST BP GE 130 - 139MM HG: CPT | Performed by: NURSE PRACTITIONER

## 2025-03-25 PROCEDURE — 99214 OFFICE O/P EST MOD 30 MIN: CPT | Performed by: NURSE PRACTITIONER

## 2025-03-25 PROCEDURE — 4004F PT TOBACCO SCREEN RCVD TLK: CPT | Performed by: NURSE PRACTITIONER

## 2025-03-25 PROCEDURE — G8417 CALC BMI ABV UP PARAM F/U: HCPCS | Performed by: NURSE PRACTITIONER

## 2025-03-25 PROCEDURE — 3079F DIAST BP 80-89 MM HG: CPT | Performed by: NURSE PRACTITIONER

## 2025-03-25 PROCEDURE — G8427 DOCREV CUR MEDS BY ELIG CLIN: HCPCS | Performed by: NURSE PRACTITIONER

## 2025-03-25 RX ORDER — CLOTRIMAZOLE 10 MG/1
10 LOZENGE ORAL
Qty: 50 TABLET | Refills: 0 | Status: SHIPPED | OUTPATIENT
Start: 2025-03-25 | End: 2025-04-04

## 2025-03-25 RX ORDER — FLUTICASONE PROPIONATE 50 MCG
1 SPRAY, SUSPENSION (ML) NASAL DAILY PRN
COMMUNITY

## 2025-03-25 NOTE — PROGRESS NOTES
[facility] PROFESSIONAL [facility]  [address]  [facility] [address]  Dept: [phone]  Dept Fax: [phone]  Loc: [phone]     Visit Date:  3/25/2025    Provider: KAYLA Espinosa CNP        Patient:  Yordan Nunn  YOB: 1975    HPI:     Chief Complaint   Patient presents with    Sore Throat     Patient has been experiencing sickness for the past month. Patient states that his throat has been uncomfortable and believes he may have thrush.       History of Present Illness  The patient is a 49-year-old male who presents for evaluation of a sore throat.    He has been experiencing malaise for the past month, which began with an episode of fever at work. His temperature peaked at 103 degrees Fahrenheit, leading to a telehealth consultation where he was diagnosed with influenza and walking pneumonia. He was prescribed cough medicine, steroids, and an inhaler. Despite completing the course of cough medicine and transitioning to over-the-counter alternatives, his condition only slightly improved. Last week on Tuesday, severe dyspnea without any associated fever prompted a visit to urgent care where he was diagnosed with bronchitis. Additional steroids and a liquid cough medicine were prescribed. Frequent nosebleeds were reported last week, attributed to low humidity levels in his home. A cool mist humidifier has been in use since Saturday. Steroids have been used throughout this period, including nasal steroids and albuterol. Nasal steroids were discontinued when oral steroids were started but resumed after completing the course of oral steroids.    Difficulty sleeping due to throat discomfort was reported, later identified as originating from the back of the tongue. Ibuprofen was taken to alleviate the pain, and difficulty swallowing even saliva was noted. Consultation with the school nurse suggested the possibility of thrush. Mild sinus

## 2025-04-11 ENCOUNTER — HOSPITAL ENCOUNTER (EMERGENCY)
Age: 50
Discharge: HOME | End: 2025-04-11
Payer: COMMERCIAL

## 2025-04-11 VITALS
TEMPERATURE: 100.2 F | BODY MASS INDEX: 40.67 KG/M2 | HEART RATE: 102 BPM | DIASTOLIC BLOOD PRESSURE: 94 MMHG | SYSTOLIC BLOOD PRESSURE: 138 MMHG | RESPIRATION RATE: 16 BRPM | OXYGEN SATURATION: 100 % | WEIGHT: 252 LBS

## 2025-04-11 DIAGNOSIS — K04.7 DENTAL INFECTION: Primary | ICD-10-CM

## 2025-04-11 PROCEDURE — 99213 OFFICE O/P EST LOW 20 MIN: CPT | Performed by: NURSE PRACTITIONER

## 2025-04-11 PROCEDURE — 99213 OFFICE O/P EST LOW 20 MIN: CPT

## 2025-04-11 RX ORDER — CHLORAL HYDRATE 500 MG
CAPSULE ORAL DAILY
COMMUNITY

## 2025-04-11 RX ORDER — IBUPROFEN 800 MG/1
800 TABLET, FILM COATED ORAL EVERY 8 HOURS PRN
Qty: 30 TABLET | Refills: 0 | Status: SHIPPED | OUTPATIENT
Start: 2025-04-11

## 2025-04-11 ASSESSMENT — PAIN - FUNCTIONAL ASSESSMENT
PAIN_FUNCTIONAL_ASSESSMENT: 0-10
PAIN_FUNCTIONAL_ASSESSMENT: ACTIVITIES ARE NOT PREVENTED

## 2025-04-11 ASSESSMENT — ENCOUNTER SYMPTOMS
SHORTNESS OF BREATH: 0
COUGH: 0
TROUBLE SWALLOWING: 0
FACIAL SWELLING: 0

## 2025-04-11 ASSESSMENT — PAIN DESCRIPTION - FREQUENCY: FREQUENCY: CONTINUOUS

## 2025-04-11 ASSESSMENT — PAIN DESCRIPTION - ORIENTATION: ORIENTATION: RIGHT;UPPER

## 2025-04-11 ASSESSMENT — PAIN SCALES - GENERAL: PAINLEVEL_OUTOF10: 6

## 2025-04-11 ASSESSMENT — PAIN DESCRIPTION - LOCATION: LOCATION: TEETH

## 2025-04-11 NOTE — ED PROVIDER NOTES
[facility] URGENT CARE  UrgentCare Encounter      CHIEFCOMPLAINT       Chief Complaint   Patient presents with    Dental Pain       Nurses Notes reviewed and I agree except as noted in the HPI.  HISTORY OF PRESENT ILLNESS     Yordan Nunn is a 49 y.o. male who presents to the urgent care for evaluation of dental pain on the right upper side that started today. The tooth was previously broken, but never gave him problems. Has a dental appointment next week. Developed a fever today also.  He is taking Tylenol and ibuprofen as needed.    The patient/patient representative has no other acute complaints at this time.    REVIEW OF SYSTEMS     Review of Systems   Constitutional:  Negative for chills and fever.   HENT:  Positive for dental problem. Negative for facial swelling and trouble swallowing.    Respiratory:  Negative for cough and shortness of breath.    Cardiovascular:  Negative for chest pain.   Skin:  Negative for rash.       PAST MEDICAL HISTORY         Diagnosis Date    ADHD (attention deficit hyperactivity disorder) 1984    GERD (gastroesophageal reflux disease)     Hiatal hernia     Hypertension 2015    Irritable bowel syndrome 2010    Obesity 2005       SURGICAL HISTORY     Patient  has a past surgical history that includes Tonsillectomy (2011); Adenoidectomy; Breast lumpectomy (2004); Upper gastrointestinal endoscopy (x2); hernia repair; Gastric fundoplication (10/2017); Upper gastrointestinal endoscopy (N/A, 02/15/2019); Colonoscopy; Umbilical hernia repair (1978); and shoulder surgery (Left, 2024).    CURRENT MEDICATIONS       Previous Medications    FLUTICASONE (FLONASE) 50 MCG/ACT NASAL SPRAY    1 spray by Each Nostril route daily as needed for Rhinitis    LORATADINE (CLARITIN) 10 MG TABLET    Take 1 tablet by mouth daily    LOSARTAN (COZAAR) 25 MG TABLET    Take 1 tablet by mouth daily    OMEGA-3 FATTY ACIDS (FISH OIL) 1000 MG CAPSULE    Take by mouth daily    ROSUVASTATIN (CRESTOR) 5 MG TABLET     Addressed This Visit    None  Visit Diagnoses         Dental infection    -  Primary    Relevant Medications    amoxicillin-clavulanate (AUGMENTIN) 875-125 MG per tablet    ibuprofen (ADVIL;MOTRIN) 800 MG tablet               The results of pertinent diagnostic studies and exam findings were discussed with patient/patient representative.   Shared decision-making was performed and patient/patient representative are agreeable that they are suitable for discharge at this time.  The patient’s provisional diagnosis and plan of care were discussed with the patient/patient representative who expressed understanding.  The patient/representative is given strict written and verbal instructions about care at home, including over-the-counter management, prescription information, follow-up, and signs and symptoms of worsening of condition, and the patient/patient representative did verbalize understanding of these instructions.  Patient will go to nearest emergency department if symptoms change or worsen, or for any sign or symptom deemed emergent by the patient or family members. Follow up as an outpatient with PCP within the next 3 days, or sooner if symptoms warrant.       PATIENT REFERRED TO:  Julien Hastings MD  [address] [place], [place] [id number]  [phone]    Schedule an appointment as soon as possible for a visit in 3 days  as needed, For further evaluation., If symptoms change/worsen, go to the ER      KAYLA Hand CNP    Please note that some or all of this chart was generated using [employer] voice recognition software. Although every effort was made to ensure the accuracy of this automated transcription, some errors in transcription may have occurred.         Shari Cheatham APRN - CNP  04/11/25 0387

## 2025-04-11 NOTE — ED TRIAGE NOTES
Yordan arrives to room with complaint of  lost energy today, has broken tooth top right for past year, dental appt on Tue.       Taking ibuprofen and tylenol, last dose ibuprofen 800 mg at 0930 today, saltwater rinse

## 2025-05-02 ENCOUNTER — TELEMEDICINE (OUTPATIENT)
Dept: FAMILY MEDICINE CLINIC | Age: 50
End: 2025-05-02
Payer: COMMERCIAL

## 2025-05-02 DIAGNOSIS — J30.89 NON-SEASONAL ALLERGIC RHINITIS DUE TO OTHER ALLERGIC TRIGGER: Primary | ICD-10-CM

## 2025-05-02 PROCEDURE — 99214 OFFICE O/P EST MOD 30 MIN: CPT | Performed by: FAMILY MEDICINE

## 2025-05-02 RX ORDER — MONTELUKAST SODIUM 10 MG/1
10 TABLET ORAL DAILY
Qty: 90 TABLET | Refills: 3 | Status: SHIPPED | OUTPATIENT
Start: 2025-05-02

## 2025-05-02 RX ORDER — PREDNISONE 20 MG/1
20 TABLET ORAL 2 TIMES DAILY
Qty: 10 TABLET | Refills: 0 | Status: SHIPPED | OUTPATIENT
Start: 2025-05-02 | End: 2025-05-07

## 2025-05-02 ASSESSMENT — ENCOUNTER SYMPTOMS
RHINORRHEA: 1
COUGH: 1

## 2025-05-02 NOTE — PROGRESS NOTES
school; [employer]  Provider was located at Facility (Appt Dept): [address]  [place] [address]  Confirm you are appropriately licensed, registered, or certified to deliver care in the state where the patient is located as indicated above. If you are not or unsure, please re-schedule the visit: Yes, I confirm.       Total time spent on this encounter: Not billed by time    --Julien Hastings MD on 5/2/2025 at 9:59 AM    An electronic signature was used to authenticate this note.

## 2025-06-25 ENCOUNTER — OFFICE VISIT (OUTPATIENT)
Dept: FAMILY MEDICINE CLINIC | Age: 50
End: 2025-06-25
Payer: COMMERCIAL

## 2025-06-25 VITALS
TEMPERATURE: 99.2 F | OXYGEN SATURATION: 94 % | BODY MASS INDEX: 40.98 KG/M2 | WEIGHT: 255 LBS | SYSTOLIC BLOOD PRESSURE: 118 MMHG | HEART RATE: 82 BPM | RESPIRATION RATE: 18 BRPM | DIASTOLIC BLOOD PRESSURE: 86 MMHG | HEIGHT: 66 IN

## 2025-06-25 DIAGNOSIS — E66.813 CLASS 3 SEVERE OBESITY DUE TO EXCESS CALORIES WITH SERIOUS COMORBIDITY AND BODY MASS INDEX (BMI) OF 40.0 TO 44.9 IN ADULT (HCC): ICD-10-CM

## 2025-06-25 DIAGNOSIS — Z00.00 WELL ADULT EXAM: Primary | ICD-10-CM

## 2025-06-25 DIAGNOSIS — I10 PRIMARY HYPERTENSION: ICD-10-CM

## 2025-06-25 PROCEDURE — 3079F DIAST BP 80-89 MM HG: CPT | Performed by: FAMILY MEDICINE

## 2025-06-25 PROCEDURE — 99396 PREV VISIT EST AGE 40-64: CPT | Performed by: FAMILY MEDICINE

## 2025-06-25 PROCEDURE — 3074F SYST BP LT 130 MM HG: CPT | Performed by: FAMILY MEDICINE

## 2025-06-25 ASSESSMENT — ENCOUNTER SYMPTOMS
NAUSEA: 0
EYE DISCHARGE: 0
PHOTOPHOBIA: 0
WHEEZING: 0
SHORTNESS OF BREATH: 0
ABDOMINAL PAIN: 0
SORE THROAT: 0

## 2025-06-25 NOTE — PROGRESS NOTES
[facility] PROFESSIONAL [facility] MEDICINE  [address]  [facility] [address]  Dept: [phone]  Dept Fax: [phone]  Loc: [phone]  PROGRESS NOTE      Visit Date: 6/25/2025    Yordan Nunn is a 49 y.o. male who presents today for:  Chief Complaint   Patient presents with    Annual Exam       Chief complaint:  physical    Subjective:  HPI    Well adult exam    Exercise:  working  Diet:  nothing particular  Last Dentist appt:  up to date  Last optometry appt:  up to date  Sleep:  good  Mood:   good  Other concerns:       Allergies.  Much better with singulair.  He did take the prednisone.    Works at [employer]    Review of Systems   Constitutional:  Negative for fever and unexpected weight change.   HENT:  Negative for ear pain and sore throat.    Eyes:  Negative for photophobia and discharge.   Respiratory:  Negative for shortness of breath and wheezing.    Cardiovascular:  Negative for chest pain and leg swelling.   Gastrointestinal:  Negative for abdominal pain and nausea.   Endocrine: Negative for cold intolerance and heat intolerance.   Genitourinary:  Negative for dysuria and frequency.   Musculoskeletal:  Positive for arthralgias.   Skin:  Negative for pallor and rash.   Neurological:  Negative for syncope and light-headedness.   Hematological:  Negative for adenopathy. Does not bruise/bleed easily.   Psychiatric/Behavioral:  Negative for sleep disturbance. The patient is not nervous/anxious.      Patient Active Problem List   Diagnosis    GERD (gastroesophageal reflux disease)    Hiatal hernia    Primary hypertension    Pure hypercholesterolemia     Past Medical History:   Diagnosis Date    ADHD (attention deficit hyperactivity disorder) 1984    Allergic rhinitis     GERD (gastroesophageal reflux disease)     Hiatal hernia     Hypertension 2015    Irritable bowel syndrome 2010    Obesity 2005      Past Surgical History:   Procedure Laterality Date

## (undated) DEVICE — SET LNR RED GRN W/ BASE CLEANASCOPE

## (undated) DEVICE — ENDO KIT: Brand: MEDLINE INDUSTRIES, INC.

## (undated) DEVICE — CATHETER ETER IV 22GA L1IN POLYUR STR RADPQ INTROCAN SFTY

## (undated) DEVICE — CONMED SCOPE SAVER BITE BLOCK, 20X27 MM: Brand: SCOPE SAVER

## (undated) DEVICE — FORCEP RAD JAW W/NEEDLE 160CM

## (undated) DEVICE — CONNECTOR TBNG AUX H2O JET DISP FOR OLY 160/180 SER

## (undated) DEVICE — SET ADMIN 25ML L117IN PMP MOD CK VLV RLER CLMP 2 SMRTSITE

## (undated) DEVICE — IV START KIT: Brand: MEDLINE INDUSTRIES, INC.

## (undated) DEVICE — YANKAUER,BULB TIP,W/O VENT,RIGID,STERILE: Brand: MEDLINE

## (undated) DEVICE — TUBING IV STOPCOCK 48 CM 3 W

## (undated) DEVICE — SOLUTION IV 1000ML 0.45% SOD CHL PH 5 INJ USP VIAFLX PLAS